# Patient Record
Sex: FEMALE | Race: OTHER | HISPANIC OR LATINO
[De-identification: names, ages, dates, MRNs, and addresses within clinical notes are randomized per-mention and may not be internally consistent; named-entity substitution may affect disease eponyms.]

---

## 2024-01-27 ENCOUNTER — NON-APPOINTMENT (OUTPATIENT)
Age: 29
End: 2024-01-27

## 2024-01-28 ENCOUNTER — EMERGENCY (EMERGENCY)
Facility: HOSPITAL | Age: 29
LOS: 1 days | Discharge: HOME | End: 2024-01-28
Attending: EMERGENCY MEDICINE | Admitting: EMERGENCY MEDICINE
Payer: MEDICAID

## 2024-01-28 VITALS
TEMPERATURE: 99 F | HEIGHT: 61 IN | WEIGHT: 134.92 LBS | RESPIRATION RATE: 16 BRPM | DIASTOLIC BLOOD PRESSURE: 84 MMHG | SYSTOLIC BLOOD PRESSURE: 122 MMHG | HEART RATE: 83 BPM | OXYGEN SATURATION: 98 %

## 2024-01-28 DIAGNOSIS — Z20.822 CONTACT WITH AND (SUSPECTED) EXPOSURE TO COVID-19: ICD-10-CM

## 2024-01-28 DIAGNOSIS — R10.9 UNSPECIFIED ABDOMINAL PAIN: ICD-10-CM

## 2024-01-28 DIAGNOSIS — R74.01 ELEVATION OF LEVELS OF LIVER TRANSAMINASE LEVELS: ICD-10-CM

## 2024-01-28 DIAGNOSIS — M54.9 DORSALGIA, UNSPECIFIED: ICD-10-CM

## 2024-01-28 DIAGNOSIS — R50.9 FEVER, UNSPECIFIED: ICD-10-CM

## 2024-01-28 DIAGNOSIS — R19.7 DIARRHEA, UNSPECIFIED: ICD-10-CM

## 2024-01-28 DIAGNOSIS — R11.0 NAUSEA: ICD-10-CM

## 2024-01-28 LAB
ALBUMIN SERPL ELPH-MCNC: 3.7 G/DL — SIGNIFICANT CHANGE UP (ref 3.4–5)
ALP SERPL-CCNC: 166 U/L — HIGH (ref 40–120)
ALT FLD-CCNC: 579 U/L — HIGH (ref 12–42)
ANION GAP SERPL CALC-SCNC: 9 MMOL/L — SIGNIFICANT CHANGE UP (ref 9–16)
APPEARANCE UR: CLEAR — SIGNIFICANT CHANGE UP
AST SERPL-CCNC: 533 U/L — HIGH (ref 15–37)
BACTERIA # UR AUTO: ABNORMAL /HPF
BASOPHILS # BLD AUTO: 0 K/UL — SIGNIFICANT CHANGE UP (ref 0–0.2)
BASOPHILS NFR BLD AUTO: 0 % — SIGNIFICANT CHANGE UP (ref 0–2)
BILIRUB SERPL-MCNC: 1 MG/DL — SIGNIFICANT CHANGE UP (ref 0.2–1.2)
BILIRUB UR-MCNC: NEGATIVE — SIGNIFICANT CHANGE UP
BUN SERPL-MCNC: 7 MG/DL — SIGNIFICANT CHANGE UP (ref 7–23)
CALCIUM SERPL-MCNC: 9 MG/DL — SIGNIFICANT CHANGE UP (ref 8.5–10.5)
CHLORIDE SERPL-SCNC: 104 MMOL/L — SIGNIFICANT CHANGE UP (ref 96–108)
CO2 SERPL-SCNC: 27 MMOL/L — SIGNIFICANT CHANGE UP (ref 22–31)
COLOR SPEC: SIGNIFICANT CHANGE UP
CREAT SERPL-MCNC: 0.74 MG/DL — SIGNIFICANT CHANGE UP (ref 0.5–1.3)
DIFF PNL FLD: ABNORMAL
EGFR: 114 ML/MIN/1.73M2 — SIGNIFICANT CHANGE UP
EOSINOPHIL # BLD AUTO: 0.05 K/UL — SIGNIFICANT CHANGE UP (ref 0–0.5)
EOSINOPHIL NFR BLD AUTO: 1 % — SIGNIFICANT CHANGE UP (ref 0–6)
EPI CELLS # UR: PRESENT
FLUAV AG NPH QL: SIGNIFICANT CHANGE UP
FLUBV AG NPH QL: SIGNIFICANT CHANGE UP
GLUCOSE SERPL-MCNC: 102 MG/DL — HIGH (ref 70–99)
GLUCOSE UR QL: NEGATIVE MG/DL — SIGNIFICANT CHANGE UP
HCG UR QL: NEGATIVE — SIGNIFICANT CHANGE UP
HCT VFR BLD CALC: 36.1 % — SIGNIFICANT CHANGE UP (ref 34.5–45)
HETEROPH AB TITR SER AGGL: NEGATIVE — SIGNIFICANT CHANGE UP
HGB BLD-MCNC: 12 G/DL — SIGNIFICANT CHANGE UP (ref 11.5–15.5)
KETONES UR-MCNC: NEGATIVE MG/DL — SIGNIFICANT CHANGE UP
LEUKOCYTE ESTERASE UR-ACNC: ABNORMAL
LYMPHOCYTES # BLD AUTO: 1.58 K/UL — SIGNIFICANT CHANGE UP (ref 1–3.3)
LYMPHOCYTES # BLD AUTO: 29 % — SIGNIFICANT CHANGE UP (ref 13–44)
MCHC RBC-ENTMCNC: 30.3 PG — SIGNIFICANT CHANGE UP (ref 27–34)
MCHC RBC-ENTMCNC: 33.2 GM/DL — SIGNIFICANT CHANGE UP (ref 32–36)
MCV RBC AUTO: 91.2 FL — SIGNIFICANT CHANGE UP (ref 80–100)
MONOCYTES # BLD AUTO: 0.87 K/UL — SIGNIFICANT CHANGE UP (ref 0–0.9)
MONOCYTES NFR BLD AUTO: 16 % — HIGH (ref 2–14)
NEUTROPHILS # BLD AUTO: 2.67 K/UL — SIGNIFICANT CHANGE UP (ref 1.8–7.4)
NEUTROPHILS NFR BLD AUTO: 49 % — SIGNIFICANT CHANGE UP (ref 43–77)
NITRITE UR-MCNC: NEGATIVE — SIGNIFICANT CHANGE UP
NRBC # BLD: 0 /100 WBCS — SIGNIFICANT CHANGE UP (ref 0–0)
NRBC # BLD: SIGNIFICANT CHANGE UP /100 WBCS (ref 0–0)
PH UR: 5.5 — SIGNIFICANT CHANGE UP (ref 5–8)
PLAT MORPH BLD: NORMAL — SIGNIFICANT CHANGE UP
PLATELET # BLD AUTO: 141 K/UL — LOW (ref 150–400)
POTASSIUM SERPL-MCNC: 4.4 MMOL/L — SIGNIFICANT CHANGE UP (ref 3.5–5.3)
POTASSIUM SERPL-SCNC: 4.4 MMOL/L — SIGNIFICANT CHANGE UP (ref 3.5–5.3)
PROT SERPL-MCNC: 7.2 G/DL — SIGNIFICANT CHANGE UP (ref 6.4–8.2)
PROT UR-MCNC: 30 MG/DL
RBC # BLD: 3.96 M/UL — SIGNIFICANT CHANGE UP (ref 3.8–5.2)
RBC # FLD: 13.4 % — SIGNIFICANT CHANGE UP (ref 10.3–14.5)
RBC BLD AUTO: NORMAL — SIGNIFICANT CHANGE UP
RBC CASTS # UR COMP ASSIST: 50 /HPF — HIGH (ref 0–4)
RSV RNA NPH QL NAA+NON-PROBE: SIGNIFICANT CHANGE UP
SARS-COV-2 RNA SPEC QL NAA+PROBE: SIGNIFICANT CHANGE UP
SODIUM SERPL-SCNC: 140 MMOL/L — SIGNIFICANT CHANGE UP (ref 132–145)
SP GR SPEC: 1.02 — SIGNIFICANT CHANGE UP (ref 1–1.03)
UROBILINOGEN FLD QL: 1 MG/DL — SIGNIFICANT CHANGE UP (ref 0.2–1)
VARIANT LYMPHS # BLD: 5 % — SIGNIFICANT CHANGE UP (ref 0–6)
WBC # BLD: 5.44 K/UL — SIGNIFICANT CHANGE UP (ref 3.8–10.5)
WBC # FLD AUTO: 5.44 K/UL — SIGNIFICANT CHANGE UP (ref 3.8–10.5)
WBC UR QL: 2 /HPF — SIGNIFICANT CHANGE UP (ref 0–5)

## 2024-01-28 PROCEDURE — 99285 EMERGENCY DEPT VISIT HI MDM: CPT

## 2024-01-28 PROCEDURE — 74177 CT ABD & PELVIS W/CONTRAST: CPT | Mod: 26

## 2024-01-28 RX ORDER — IBUPROFEN 200 MG
600 TABLET ORAL ONCE
Refills: 0 | Status: COMPLETED | OUTPATIENT
Start: 2024-01-28 | End: 2024-01-28

## 2024-01-28 RX ORDER — SODIUM CHLORIDE 9 MG/ML
1000 INJECTION INTRAMUSCULAR; INTRAVENOUS; SUBCUTANEOUS ONCE
Refills: 0 | Status: COMPLETED | OUTPATIENT
Start: 2024-01-28 | End: 2024-01-28

## 2024-01-28 RX ORDER — ONDANSETRON 8 MG/1
4 TABLET, FILM COATED ORAL ONCE
Refills: 0 | Status: COMPLETED | OUTPATIENT
Start: 2024-01-28 | End: 2024-01-28

## 2024-01-28 RX ORDER — IOHEXOL 300 MG/ML
30 INJECTION, SOLUTION INTRAVENOUS ONCE
Refills: 0 | Status: COMPLETED | OUTPATIENT
Start: 2024-01-28 | End: 2024-01-28

## 2024-01-28 RX ADMIN — ONDANSETRON 4 MILLIGRAM(S): 8 TABLET, FILM COATED ORAL at 23:34

## 2024-01-28 RX ADMIN — SODIUM CHLORIDE 1000 MILLILITER(S): 9 INJECTION INTRAMUSCULAR; INTRAVENOUS; SUBCUTANEOUS at 18:48

## 2024-01-28 RX ADMIN — IOHEXOL 30 MILLILITER(S): 300 INJECTION, SOLUTION INTRAVENOUS at 19:58

## 2024-01-28 RX ADMIN — Medication 600 MILLIGRAM(S): at 23:34

## 2024-01-28 RX ADMIN — ONDANSETRON 4 MILLIGRAM(S): 8 TABLET, FILM COATED ORAL at 18:48

## 2024-01-28 NOTE — ED PROVIDER NOTE - NSFOLLOWUPINSTRUCTIONS_ED_ALL_ED_FT
IN YOUR WORK UP TODAY YOUR LIVER ENZYMES ARE ELEVATED. THIS SHOULD BE REPEATED IN THE NEAR FUTURE TO MAKE SURE IT'S BETTER.    IF YOU HAVE ANY WORSENING SYMPTOMS RETURN TO ER FOR REEVALUATION.     Back Pain    Back pain is very common in adults. The cause of back pain is rarely dangerous and the pain often gets better over time. The cause of your back pain may not be known and may include strain of muscles or ligaments, degeneration of the spinal disks, or arthritis. Occasionally the pain may radiate down your leg(s). Over-the-counter medicines to reduce pain and inflammation are often the most helpful. Stretching and remaining active frequently helps the healing process.     SEEK IMMEDIATE MEDICAL CARE IF YOU HAVE ANY OF THE FOLLOWING SYMPTOMS: bowel or bladder control problems, unusual weakness or numbness in your arms or legs, nausea or vomiting, abdominal pain, fever, dizziness/lightheadedness.

## 2024-01-28 NOTE — ED PROVIDER NOTE - PATIENT PORTAL LINK FT
You can access the FollowMyHealth Patient Portal offered by [facility] by registering at the following website: [email or web address]. By joining [facility]’s FollowMyHealth portal, you will also be able to view your health information using other applications (apps) compatible with our system.

## 2024-01-28 NOTE — ED ADULT TRIAGE NOTE - CHIEF COMPLAINT QUOTE
sent from urgent care for eval of lower back pain since thursday, fever and abd pain x 2 days,  (-covid/flu)

## 2024-01-28 NOTE — ED ADULT NURSE NOTE - NSFALLUNIVINTERV_ED_ALL_ED
Bed/Stretcher in lowest position, wheels locked, appropriate side rails in place/Call bell, personal items and telephone in reach/Instruct patient to call for assistance before getting out of bed/chair/stretcher/Non-slip footwear applied when patient is off stretcher/[place] to call system/Physically safe environment - no spills, clutter or unnecessary equipment/Purposeful proactive rounding/Room/bathroom lighting operational, light cord in reach

## 2024-01-28 NOTE — ED PROVIDER NOTE - OBJECTIVE STATEMENT
29 yo woman to ER with her partner from [facility] complaint of back and flank pain and low grade fever earlier today  flu and covid neg at [facility]   mild nausea and a small amount of diarrhea no abd pain     The patient denies the following symptoms:  headache, dizziness/lightheadedness, neck pain/neck stiffness, numbness/tingling, photophobia, change in vision/hearing/gait/mental status/speech,difficulty swallowing, focal weakness, rash,  chest/neck/jaw/arm or back pain, palpitations, shortness of breath, diaphoresis, swelling to arm and/or legs,, abdominal pain, abdominal distention, back pain, flank pain, change in urinary or bowel function, dysuria,or  hematuria

## 2024-01-28 NOTE — ED PROVIDER NOTE - CLINICAL SUMMARY MEDICAL DECISION MAKING FREE TEXT BOX
29 yo woman to ER with her partner from [facility] [place] complaint of back and flank pain and low grade fever earlier today  flu and covid neg at [facility]   mild nausea and a small amount of diarrhea no abd pain     iv labs meds and reassess 29 yo woman to ER with her partner from [facility] complaint of back and flank pain and low grade fever earlier today  flu and covid neg at [facility]   mild nausea and a small amount of diarrhea no abd pain     iv labs meds and reassess    ct and mono spot for alk phos ast alt   @830pm pt comfortable no change in clinical status updated with lab results and plan for ct scan   endorsed to dr jackson @ 845pm

## 2024-01-28 NOTE — ED ADULT NURSE NOTE - OBJECTIVE STATEMENT
aox3, breathing even and unlabored on RA c.o abd pain and lowe rback pain. denies chest pain, denies burning with urination

## 2024-01-28 NOTE — ED PROVIDER NOTE - PROGRESS NOTE DETAILS
Patient is signed out by Dr. Sanderson pending CT Abdo results.  On blood work patient noted to have a transaminitis but patient is very well-appearing and nontender in abdomen.  CT Abdo with no actionable results.  Explained to patient she should get repeat blood work in the near future to make sure this has resolved.  If any worsening symptoms encouraged to return to emergency room.  Otherwise recommend supportive treatment for back pain and likely viral illness.  Patient had a negative swab and monotest today.  Will will flag chart for call back to establish primary care.

## 2024-02-01 VITALS
WEIGHT: 134.92 LBS | DIASTOLIC BLOOD PRESSURE: 82 MMHG | RESPIRATION RATE: 18 BRPM | SYSTOLIC BLOOD PRESSURE: 125 MMHG | OXYGEN SATURATION: 97 % | HEIGHT: 61 IN | TEMPERATURE: 99 F | HEART RATE: 88 BPM

## 2024-02-01 LAB
ALBUMIN SERPL ELPH-MCNC: 3.2 G/DL — LOW (ref 3.4–5)
ALP SERPL-CCNC: 303 U/L — HIGH (ref 40–120)
ALT FLD-CCNC: 873 U/L — HIGH (ref 12–42)
ANION GAP SERPL CALC-SCNC: 8 MMOL/L — LOW (ref 9–16)
AST SERPL-CCNC: 481 U/L — HIGH (ref 15–37)
BILIRUB SERPL-MCNC: 4 MG/DL — HIGH (ref 0.2–1.2)
BUN SERPL-MCNC: 7 MG/DL — SIGNIFICANT CHANGE UP (ref 7–23)
CALCIUM SERPL-MCNC: 8.8 MG/DL — SIGNIFICANT CHANGE UP (ref 8.5–10.5)
CHLORIDE SERPL-SCNC: 102 MMOL/L — SIGNIFICANT CHANGE UP (ref 96–108)
CO2 SERPL-SCNC: 28 MMOL/L — SIGNIFICANT CHANGE UP (ref 22–31)
CREAT SERPL-MCNC: 0.67 MG/DL — SIGNIFICANT CHANGE UP (ref 0.5–1.3)
EGFR: 124 ML/MIN/1.73M2 — SIGNIFICANT CHANGE UP
FLUAV AG NPH QL: SIGNIFICANT CHANGE UP
FLUBV AG NPH QL: SIGNIFICANT CHANGE UP
GLUCOSE SERPL-MCNC: 114 MG/DL — HIGH (ref 70–99)
HCT VFR BLD CALC: 34.2 % — LOW (ref 34.5–45)
HGB BLD-MCNC: 11.3 G/DL — LOW (ref 11.5–15.5)
LACTATE BLDV-MCNC: 1.4 MMOL/L — SIGNIFICANT CHANGE UP (ref 0.5–2)
LIDOCAIN IGE QN: 47 U/L — SIGNIFICANT CHANGE UP (ref 16–77)
MCHC RBC-ENTMCNC: 30 PG — SIGNIFICANT CHANGE UP (ref 27–34)
MCHC RBC-ENTMCNC: 33 GM/DL — SIGNIFICANT CHANGE UP (ref 32–36)
MCV RBC AUTO: 90.7 FL — SIGNIFICANT CHANGE UP (ref 80–100)
PCO2 BLDV: 52 MMHG — HIGH (ref 39–42)
PH BLDV: 7.37 — SIGNIFICANT CHANGE UP (ref 7.32–7.43)
PLATELET # BLD AUTO: 185 K/UL — SIGNIFICANT CHANGE UP (ref 150–400)
PO2 BLDV: <35 MMHG — SIGNIFICANT CHANGE UP (ref 25–45)
POTASSIUM SERPL-MCNC: 4.7 MMOL/L — SIGNIFICANT CHANGE UP (ref 3.5–5.3)
POTASSIUM SERPL-SCNC: 4.7 MMOL/L — SIGNIFICANT CHANGE UP (ref 3.5–5.3)
PROT SERPL-MCNC: 7.4 G/DL — SIGNIFICANT CHANGE UP (ref 6.4–8.2)
RBC # BLD: 3.77 M/UL — LOW (ref 3.8–5.2)
RBC # FLD: 13.8 % — SIGNIFICANT CHANGE UP (ref 10.3–14.5)
RSV RNA NPH QL NAA+NON-PROBE: SIGNIFICANT CHANGE UP
SAO2 % BLDV: 26.7 % — LOW (ref 67–88)
SARS-COV-2 RNA SPEC QL NAA+PROBE: SIGNIFICANT CHANGE UP
SODIUM SERPL-SCNC: 138 MMOL/L — SIGNIFICANT CHANGE UP (ref 132–145)
WBC # BLD: 8.3 K/UL — SIGNIFICANT CHANGE UP (ref 3.8–10.5)
WBC # FLD AUTO: 8.3 K/UL — SIGNIFICANT CHANGE UP (ref 3.8–10.5)

## 2024-02-01 PROCEDURE — 99285 EMERGENCY DEPT VISIT HI MDM: CPT

## 2024-02-01 NOTE — ED ADULT TRIAGE NOTE - CHIEF COMPLAINT QUOTE
walk in pt with complaints of left sided flank pain and LUQ pain. Pt reports she was here 4 days ago and is concerned she is not getting better. Pt reports increased weakness, n/v and lethargy as well as some slight yellowing of sclera. Also endorses she was in [place] 2 weeks ago and is concerned for dengue fever as she had mult mosquito bites.

## 2024-02-02 ENCOUNTER — INPATIENT (INPATIENT)
Facility: HOSPITAL | Age: 29
LOS: 1 days | Discharge: HOME | DRG: 442 | End: 2024-02-04
Attending: HOSPITALIST | Admitting: HOSPITALIST
Payer: MEDICAID

## 2024-02-02 DIAGNOSIS — Z29.9 ENCOUNTER FOR PROPHYLACTIC MEASURES, UNSPECIFIED: ICD-10-CM

## 2024-02-02 DIAGNOSIS — E80.6 OTHER DISORDERS OF BILIRUBIN METABOLISM: ICD-10-CM

## 2024-02-02 DIAGNOSIS — R10.9 UNSPECIFIED ABDOMINAL PAIN: ICD-10-CM

## 2024-02-02 DIAGNOSIS — D64.9 ANEMIA, UNSPECIFIED: ICD-10-CM

## 2024-02-02 DIAGNOSIS — F41.9 ANXIETY DISORDER, UNSPECIFIED: ICD-10-CM

## 2024-02-02 LAB
AMPHET UR-MCNC: NEGATIVE — SIGNIFICANT CHANGE UP
APAP SERPL-MCNC: 6.8 UG/ML — LOW (ref 10–30)
APPEARANCE UR: CLEAR — SIGNIFICANT CHANGE UP
APTT BLD: 31 SEC — SIGNIFICANT CHANGE UP (ref 24.5–35.6)
BACTERIA # UR AUTO: PRESENT /HPF — SIGNIFICANT CHANGE UP
BARBITURATES UR SCN-MCNC: NEGATIVE — SIGNIFICANT CHANGE UP
BASOPHILS # BLD AUTO: 0 K/UL — SIGNIFICANT CHANGE UP (ref 0–0.2)
BASOPHILS NFR BLD AUTO: 0 % — SIGNIFICANT CHANGE UP (ref 0–2)
BENZODIAZ UR-MCNC: NEGATIVE — SIGNIFICANT CHANGE UP
BILIRUB DIRECT SERPL-MCNC: 3 MG/DL — HIGH (ref 0–0.3)
BILIRUB INDIRECT FLD-MCNC: 0.9 MG/DL — SIGNIFICANT CHANGE UP (ref 0.2–1)
BILIRUB SERPL-MCNC: 3.9 MG/DL — HIGH (ref 0.2–1.2)
BILIRUB UR-MCNC: ABNORMAL
CK SERPL-CCNC: 80 U/L — SIGNIFICANT CHANGE UP (ref 26–192)
COCAINE METAB.OTHER UR-MCNC: NEGATIVE — SIGNIFICANT CHANGE UP
COLOR SPEC: SIGNIFICANT CHANGE UP
COMMENT - URINE: SIGNIFICANT CHANGE UP
CULTURE RESULTS: SIGNIFICANT CHANGE UP
DAT POLY-SP REAG RBC QL: NEGATIVE — SIGNIFICANT CHANGE UP
DIFF PNL FLD: NEGATIVE — SIGNIFICANT CHANGE UP
EOSINOPHIL # BLD AUTO: 0 K/UL — SIGNIFICANT CHANGE UP (ref 0–0.5)
EOSINOPHIL NFR BLD AUTO: 0 % — SIGNIFICANT CHANGE UP (ref 0–6)
EPI CELLS # UR: 2 — SIGNIFICANT CHANGE UP
FENTANYL UR QL SCN: NEGATIVE — SIGNIFICANT CHANGE UP
GIANT PLATELETS BLD QL SMEAR: PRESENT — SIGNIFICANT CHANGE UP
GLUCOSE UR QL: NEGATIVE MG/DL — SIGNIFICANT CHANGE UP
GRAN CASTS # UR COMP ASSIST: 2 — SIGNIFICANT CHANGE UP
HAPTOGLOB SERPL-MCNC: <20 MG/DL — LOW (ref 34–200)
HCG SERPL-ACNC: 1 MIU/ML — SIGNIFICANT CHANGE UP
HIV 1 & 2 AB SERPL IA.RAPID: SIGNIFICANT CHANGE UP
INR BLD: 0.99 — SIGNIFICANT CHANGE UP (ref 0.85–1.18)
KETONES UR-MCNC: NEGATIVE MG/DL — SIGNIFICANT CHANGE UP
LDH SERPL L TO P-CCNC: 524 U/L — HIGH (ref 50–242)
LEUKOCYTE ESTERASE UR-ACNC: ABNORMAL
LYMPHOCYTES # BLD AUTO: 2.82 K/UL — SIGNIFICANT CHANGE UP (ref 1–3.3)
LYMPHOCYTES # BLD AUTO: 34 % — SIGNIFICANT CHANGE UP (ref 13–44)
MANUAL SMEAR VERIFICATION: SIGNIFICANT CHANGE UP
METHADONE UR-MCNC: NEGATIVE — SIGNIFICANT CHANGE UP
MONOCYTES # BLD AUTO: 1 K/UL — HIGH (ref 0–0.9)
MONOCYTES NFR BLD AUTO: 12 % — SIGNIFICANT CHANGE UP (ref 2–14)
NEUTROPHILS # BLD AUTO: 2.41 K/UL — SIGNIFICANT CHANGE UP (ref 1.8–7.4)
NEUTROPHILS NFR BLD AUTO: 26 % — LOW (ref 43–77)
NEUTS BAND # BLD: 3 % — SIGNIFICANT CHANGE UP (ref 0–8)
NITRITE UR-MCNC: NEGATIVE — SIGNIFICANT CHANGE UP
NRBC # BLD: 0 /100 WBCS — SIGNIFICANT CHANGE UP (ref 0–0)
NRBC # BLD: SIGNIFICANT CHANGE UP /100 WBCS (ref 0–0)
OPIATES UR-MCNC: NEGATIVE — SIGNIFICANT CHANGE UP
PCP SPEC-MCNC: SIGNIFICANT CHANGE UP
PCP UR-MCNC: NEGATIVE — SIGNIFICANT CHANGE UP
PH UR: 7 — SIGNIFICANT CHANGE UP (ref 5–8)
PLAT MORPH BLD: ABNORMAL
PLATELET COUNT - ESTIMATE: ABNORMAL
PROT UR-MCNC: NEGATIVE MG/DL — SIGNIFICANT CHANGE UP
PROTHROM AB SERPL-ACNC: 11.3 SEC — SIGNIFICANT CHANGE UP (ref 9.5–13)
RBC BLD AUTO: NORMAL — SIGNIFICANT CHANGE UP
RBC CASTS # UR COMP ASSIST: 0 /HPF — SIGNIFICANT CHANGE UP (ref 0–4)
SALICYLATES SERPL-MCNC: <0.2 MG/DL — LOW (ref 2.8–20)
SP GR SPEC: 1.01 — SIGNIFICANT CHANGE UP (ref 1–1.03)
SPECIMEN SOURCE: SIGNIFICANT CHANGE UP
THC UR QL: POSITIVE
UROBILINOGEN FLD QL: 2 MG/DL (ref 0.2–1)
VARIANT LYMPHS # BLD: 25 % — HIGH (ref 0–6)
WBC UR QL: 1 /HPF — SIGNIFICANT CHANGE UP (ref 0–5)

## 2024-02-02 PROCEDURE — 74177 CT ABD & PELVIS W/CONTRAST: CPT | Mod: 26

## 2024-02-02 PROCEDURE — 99223 1ST HOSP IP/OBS HIGH 75: CPT | Mod: GC

## 2024-02-02 RX ORDER — SODIUM CHLORIDE 9 MG/ML
1000 INJECTION INTRAMUSCULAR; INTRAVENOUS; SUBCUTANEOUS ONCE
Refills: 0 | Status: COMPLETED | OUTPATIENT
Start: 2024-02-02 | End: 2024-02-02

## 2024-02-02 RX ORDER — ESCITALOPRAM OXALATE 10 MG/1
20 TABLET, FILM COATED ORAL DAILY
Refills: 0 | Status: DISCONTINUED | OUTPATIENT
Start: 2024-02-02 | End: 2024-02-04

## 2024-02-02 RX ORDER — ONDANSETRON 8 MG/1
4 TABLET, FILM COATED ORAL ONCE
Refills: 0 | Status: COMPLETED | OUTPATIENT
Start: 2024-02-02 | End: 2024-02-02

## 2024-02-02 RX ORDER — IBUPROFEN 200 MG
400 TABLET ORAL ONCE
Refills: 0 | Status: COMPLETED | OUTPATIENT
Start: 2024-02-02 | End: 2024-02-02

## 2024-02-02 RX ORDER — INFLUENZA VIRUS VACCINE 15; 15; 15; 15 UG/.5ML; UG/.5ML; UG/.5ML; UG/.5ML
0.5 SUSPENSION INTRAMUSCULAR ONCE
Refills: 0 | Status: DISCONTINUED | OUTPATIENT
Start: 2024-02-02 | End: 2024-02-04

## 2024-02-02 RX ORDER — ACETAMINOPHEN 500 MG
650 TABLET ORAL EVERY 6 HOURS
Refills: 0 | Status: DISCONTINUED | OUTPATIENT
Start: 2024-02-02 | End: 2024-02-04

## 2024-02-02 RX ORDER — ONDANSETRON 8 MG/1
4 TABLET, FILM COATED ORAL EVERY 8 HOURS
Refills: 0 | Status: DISCONTINUED | OUTPATIENT
Start: 2024-02-02 | End: 2024-02-04

## 2024-02-02 RX ORDER — LANOLIN ALCOHOL/MO/W.PET/CERES
3 CREAM (GRAM) TOPICAL AT BEDTIME
Refills: 0 | Status: DISCONTINUED | OUTPATIENT
Start: 2024-02-02 | End: 2024-02-04

## 2024-02-02 RX ORDER — KETOROLAC TROMETHAMINE 30 MG/ML
15 SYRINGE (ML) INJECTION ONCE
Refills: 0 | Status: DISCONTINUED | OUTPATIENT
Start: 2024-02-02 | End: 2024-02-02

## 2024-02-02 RX ADMIN — Medication 650 MILLIGRAM(S): at 21:32

## 2024-02-02 RX ADMIN — Medication 400 MILLIGRAM(S): at 23:21

## 2024-02-02 RX ADMIN — ONDANSETRON 4 MILLIGRAM(S): 8 TABLET, FILM COATED ORAL at 04:33

## 2024-02-02 RX ADMIN — Medication 15 MILLIGRAM(S): at 04:33

## 2024-02-02 RX ADMIN — SODIUM CHLORIDE 1000 MILLILITER(S): 9 INJECTION INTRAMUSCULAR; INTRAVENOUS; SUBCUTANEOUS at 04:33

## 2024-02-02 RX ADMIN — ESCITALOPRAM OXALATE 20 MILLIGRAM(S): 10 TABLET, FILM COATED ORAL at 12:26

## 2024-02-02 RX ADMIN — SODIUM CHLORIDE 1000 MILLILITER(S): 9 INJECTION INTRAMUSCULAR; INTRAVENOUS; SUBCUTANEOUS at 00:49

## 2024-02-02 RX ADMIN — Medication 650 MILLIGRAM(S): at 23:07

## 2024-02-02 NOTE — H&P ADULT - NSHPPHYSICALEXAM_GEN_ALL_CORE
PHYSICAL EXAM:    GENERAL: resting comfortably in bed; NAD  HEAD:  Atraumatic, Normocephalic  EYES: EOMI, PERRLA, conjunctiva and sclera clear   NECK: Supple, No JVD, Normal thyroid, no enlarged nodes   NERVOUS SYSTEM:  AAOx3; CNII-XII grossly intact; no focal deficits  CHEST/LUNG: CTA B/L; no W/R/R, no retractions  HEART: S1/S2 normal, no S3, Regular rate and rhythm; No murmurs   ABDOMEN: Soft, Nondistended; LUQ TTP. Mildly tender in epigastric region. Bowel sounds present   EXTREMITIES:  2+ Peripheral Pulses, No clubbing, cyanosis, or edema   LYMPH: No lymphadenopathy noted   SKIN: No rashes or lesions  PSYCHIATRIC: affect and characteristics of appearance, verbalizations, behaviors are appropriate

## 2024-02-02 NOTE — ED PROVIDER NOTE - EYE EXAM METHOD
Bedside and Verbal shift change report given to [facility] (oncoming nurse) by Maria Santoro RN (offgoing nurse). Report included the following information SBAR, Kardex and MAR. Icteric sclera

## 2024-02-02 NOTE — H&P ADULT - PROBLEM SELECTOR PLAN 2
Pt w/ abdominal pain, scleral icterus after recent trip to [place]. Found to have direct hyperbilirubinemia.    -trend fractionated bilirubin   -f/u abdominal US to evaluate for biliary duct dilatation or stones  -w/u as above Pt w/ abdominal pain, after recent trip to [place]. Found to have direct hyperbilirubinemia.    -trend fractionated bilirubin   -w/u as above Pt w/ abdominal pain, after recent trip to [place]. Found to have direct hyperbilirubinemia. Endorses darker urine and light stools.     -trend fractionated bilirubin   -w/u as above

## 2024-02-02 NOTE — H&P ADULT - PROBLEM SELECTOR PLAN 1
#transaminitis  Presenting with nausea, abdominal pain, and inability to tolerate solid foods after a recent trip to [place]. Found to have hepatosplenomegaly and elevated LFTs likely 2/2 infectious process.   CT w/ hepatosplenogaly   Monospot negative during ED visit on 1/28    -f/u hepatitis panel  -f/u dengue antibodies  -trend LFTs #transaminitis  Presenting with nausea, abdominal pain, emesis, and inability to tolerate solid foods after a recent trip to [place]. Found to have hepatosplenomegaly and elevated LFTs likely 2/2 infectious process. Patient also endorses marijuana use daily w/ improvement of symptoms with hot showers. CMP findings more indicative of infectious process vs cannabinoid hyperemesis syndrome   CT w/ hepatosplenomegaly   Monospot negative during ED visit on 1/28    -f/u hepatitis panel  -f/u dengue antibodies  -f/u abdominal US to evaluate for biliary duct dilatation or stones  -trend LFTs #transaminitis  Presenting with nausea, abdominal pain, emesis, and inability to tolerate solid foods after a recent trip to [place]. Found to have hepatosplenomegaly and elevated LFTs likely 2/2 infectious process. Patient also endorses marijuana use daily w/ improvement of symptoms with hot showers. CMP findings more indicative of infectious process vs cannabinoid hyperemesis syndrome   CT w/ hepatosplenomegaly   Monospot negative during ED visit on 1/28    -f/u hepatitis panel  -f/u dengue antibodies  -f/u abdominal US to evaluate for biliary duct dilatation or stones  -trend LFTs  -f/u CMV EBV AMA

## 2024-02-02 NOTE — ED PROVIDER NOTE - CPE EDP HEAD NORM PED
"Javier Kyleaki Anderson was seen and treated in our emergency department on 1/16/2023.  She may return with limitations on 01/17/2023.       Sincerely,      KAI Hill    " Head atraumatic, normal cephalic shape.

## 2024-02-02 NOTE — ED PROVIDER NOTE - CLINICAL SUMMARY MEDICAL DECISION MAKING FREE TEXT BOX
Patient is a 28-year-old female previously well with no past medical history who recently traveled to [place] from January 14 to January 20, she is only on Lexapro for mild anxiety otherwise takes no other over-the-counter medications.  Patient reports right upper quadrant and epigastric abdominal pain for several days.  72 hours ago she was seen in the emergency room with nausea vomiting had mild elevation of her LFTs and a normal bilirubin.  Her Monospot was negative.  And she had a CT abdomen pelvis that showed hepatosplenomegaly.  Patient notes since going home she has not felt better she has been unable to tolerate solids and only fluids.  Has persistent nausea and today noticed that her eyes were yellow.  She denies a history of Tylenol overuse abuse, denies recreational drug use denies alcohol and non-smoker.      Abdominal exam without peritoneal signs. No evidence of acute abdomen at this time. Well appearing nut jaundiced. Recent workup with mild elevation of LFTs but bilirubin was normal, rule out acute hepatobiliary disease (including acute cholecystitis or cholangitis), acute pancreatitis (neg lipase), PUD (including gastric perforation), acute infectious processes (pneumonia, hepatitis, pyelonephritis), acute appendicitis, bowel obstruction, viscus perforation, or Diverticulitis.  Plan is to obtain comprehensive metabolic panel including bili and LFTs, check urine, EKG is nonischemic I do not suspect ACS.  I do not suspect pulmonary embolus.  Patient recently traveled to [place].  Rule out dengue sign dengue testing hepatitis panel testing.  Monospot was -4 days ago.

## 2024-02-02 NOTE — H&P ADULT - PROBLEM SELECTOR PLAN 5
F: None   E: Replete as necessary K>4 Mg>2  N: full liquid diet, advance as tolerated     DVT Prophylaxis: none needed  GI prophylaxis: None   CODE STATUS: FULL

## 2024-02-02 NOTE — ED PROVIDER NOTE - OBJECTIVE STATEMENT
Patient is a 28-year-old female previously well with no past medical history who recently traveled to [place] from January 14 to January 20, she is only on Lexapro for mild anxiety otherwise takes no other over-the-counter medications.  Patient reports right upper quadrant and epigastric abdominal pain for several days.  72 hours ago she was seen in the emergency room with nausea vomiting had mild elevation of her LFTs and a normal bilirubin.  Her Monospot was negative.  And she had a CT abdomen pelvis that showed hepatosplenomegaly.  Patient notes since going home she has not felt better she has been unable to tolerate solids and only fluids.  Has persistent nausea and today noticed that her eyes were yellow.  She denies a history of Tylenol overuse abuse, denies recreational drug use denies alcohol and non-smoker.

## 2024-02-02 NOTE — H&P ADULT - PROBLEM SELECTOR PLAN 3
Found to have Hgb of 11.3 on presentation, previous Hgb 12 three days prior. MCV WNL. Likely 2/2 hemolysis     -f/u LDH, haptoglobin   -plan as above

## 2024-02-02 NOTE — H&P ADULT - HISTORY OF PRESENT ILLNESS
HPI:  28F PMHx of anxiety who presents with nausea and abdominal pain. Pt presented to [facility] ED 3 days ago with similar complaints, at that time LFTs were elevated, CT +hepatosplenomegaly, neg monospot. Coming back today because she has not felt better since leaving the ED and has been unable to tolerate any solids, only fluids. Today she noticed that her eyes were yellow. Of note, the patient traveled to [place] from 1/14-1/20.    ED vitals: T  99 HR 88  RR 18 /82 SpO2 97% RA  Labs signficant: WBC 8.3, Hgb 11.3 Alk Phos 303, ,  Dir Bili 3.0 Indirect 0.9, Utox + THC  Imaging/EKG: NSR  CT AP w/IV hepatosplenomegaly   Interventions: 2L NS, toradol, zofran    PAST MEDICAL & SURGICAL HISTORY:  Anxiety    FAMILY HISTORY:  :      Allergies    No Known Allergies    Intolerances    :  Home Medications:  :    SOCIAL HX:     Smoking          ETOH/Illicit drugs          Occupation   HPI:  28F PMHx of anxiety who presents with nausea, abdominal pain, back pain and emesis. Pt presented to [facility] ED 3 days ago with similar complaints, at that time LFTs were elevated, CT +hepatosplenomegaly, neg monospot. Prior to coming to the ED three days ago, she went to an [facility] where she states she was febrile to "100. something". Coming back today because she has not felt better since leaving the ED and has been unable to tolerate any solids, only fluids. Today she noticed that her eyes were yellow. Of note, the patient traveled to [place] from 1/14-1/20. During her stay at [place], she endorses eating street food but no fish. Patient denies any vision changes, myalgias, joint pain, numbness, tingling.     ED vitals: T  99 HR 88  RR 18 /82 SpO2 97% RA  Labs signficant: WBC 8.3, Hgb 11.3 Alk Phos 303, ,  Dir Bili 3.0 Indirect 0.9, Utox + THC  Imaging/EKG: NSR  CT AP w/IV hepatosplenomegaly   Interventions: 2L NS, toradol, zofran    PAST MEDICAL & SURGICAL HISTORY:  Anxiety    FAMILY HISTORY:  :      Allergies    No Known Allergies    Intolerances    :  Home Medications:  Lexapro 20    SOCIAL HX:     Smoking        Marijuana 2-3x day, No tobacco use  ETOH/Illicit drugs        No   HPI:  28F PMHx of anxiety who presents with nausea, abdominal pain, back pain and emesis since last week. Pt presented to [facility] ED 3 days ago with similar complaints, at that time LFTs were elevated, CT +hepatosplenomegaly, neg monospot. Prior to coming to the ED three days ago, she went to an [facility] where she states she was febrile to "100. something". Coming back today because she has not felt better since leaving the ED and has been unable to tolerate any solids, only fluids. Today she noticed that her eyes were yellow. Of note, the patient traveled to [place] from 1/14-1/20. During her stay at [place], she endorses eating street food but no fish. Patient denies any vision changes, myalgias, diarrhea, joint pain, numbness, tingling. Endorses lighter stools and darker urine.    ED vitals: T  99 HR 88  RR 18 /82 SpO2 97% RA  Labs signficant: WBC 8.3, Hgb 11.3 Alk Phos 303, ,  Dir Bili 3.0 Indirect 0.9, Utox + THC  Imaging/EKG: NSR  CT AP w/IV hepatosplenomegaly   Interventions: 2L NS, toradol, zofran    PAST MEDICAL & SURGICAL HISTORY:  Anxiety    FAMILY HISTORY:  :      Allergies    No Known Allergies    Intolerances    :  Home Medications:  Lexapro 20    SOCIAL HX:     Smoking        Marijuana 2-3x day, No tobacco use  ETOH/Illicit drugs        No

## 2024-02-02 NOTE — H&P ADULT - ATTENDING COMMENTS
29YO F presenting with nausea, abdominal pain, and inability to tolerate solid foods after a recent trip to [place]. Found to have hepatosplenomegaly and elevated LFTs likely 2/2 infectious process. Admitted for further evaluation     #Transaminitis   #Hyperbilirubinemia   #Anemia     -CT: Hepatosplenomegaly. No other abnormality detected. No biliary dilation or Gall bladder pathology.   -Considering direct hyperbilirubinemia and abdominal pain, follow US RUQ.   -Follow Acute hep panel, dengue, EBV and CMV serologies  -Hemolytic anemia, follow blood smear, angela and coags. Currently hemodynamically stable.     Rest as per resident note

## 2024-02-02 NOTE — PATIENT PROFILE ADULT - FALL HARM RISK - UNIVERSAL INTERVENTIONS
Bed in lowest position, wheels locked, appropriate side rails in place/Call bell, personal items and telephone in reach/Instruct patient to call for assistance before getting out of bed or chair/Non-slip footwear when patient is out of bed/[place] to call system/Physically safe environment - no spills, clutter or unnecessary equipment/Purposeful Proactive Rounding/Room/bathroom lighting operational, light cord in reach

## 2024-02-02 NOTE — H&P ADULT - ASSESSMENT
29YO F presenting with nausea, abdominal pain, and inability to tolerate solid foods after a recent trip to [place]. Found to have hepatosplenomegaly and elevated LFTs likely 2/2 infectious process.

## 2024-02-02 NOTE — H&P ADULT - PROBLEM SELECTOR PLAN 4
Hx of anxiety  Home med: lexapro    -c home med Hx of anxiety  Home med: lexapro 20 qd    -c home med

## 2024-02-02 NOTE — ED ADULT NURSE REASSESSMENT NOTE - NS ED NURSE REASSESS COMMENT FT1
Received handoff from Maia Ardon RN. Patient in no acute distress. patient aaox4. patient comfortable. all needs met at this time. care continues. BMT

## 2024-02-02 NOTE — ED PROVIDER NOTE - PROGRESS NOTE DETAILS
I spoke presented the patient to Dr. Mustafa who agrees patient should be admitted as she is now having elevated bilirubin and she is jaundiced.  Recommended that we repeat the CT abdomen pelvis for biliary dilatation.  Patient otherwise has no signs of acute abdomen.  Also added additional testing including direct and indirect bilirubin, HIV testing is negative.  Added LDH and haptoglobin.  Patient to be endorsed to regional medicine if CT abdomen pelvis is stable for regional medicine.  Will sign out to Dr. Morales pending admission

## 2024-02-02 NOTE — H&P ADULT - NSHPLABSRESULTS_GEN_ALL_CORE
LABS:                         11.3   8.30  )-----------( 185      ( 2024 22:14 )             34.2         138  |  102  |  7   ----------------------------<  114<H>  4.7   |  28  |  0.67    Ca    8.8      2024 22:14    TPro  x   /  Alb  x   /  TBili  3.9<H>  /  DBili  3.0<H>  /  AST  x   /  ALT  x   /  AlkPhos  x         Urinalysis Basic - ( 2024 01:24 )    Color: Dark Yellow / Appearance: Clear / S.008 / pH: x  Gluc: x / Ketone: Negative mg/dL  / Bili: Small / Urobili: 2.0 mg/dL   Blood: x / Protein: Negative mg/dL / Nitrite: Negative   Leuk Esterase: Trace / RBC: 0 /HPF / WBC 1 /HPF   Sq Epi: x / Non Sq Epi: x / Bacteria: Present /HPF      CARDIAC MARKERS ( 2024 23:45 )  x     / x     / 80 U/L / x     / x                RADIOLOGY, EKG & ADDITIONAL TESTS: Reviewed.

## 2024-02-03 DIAGNOSIS — D59.9 ACQUIRED HEMOLYTIC ANEMIA, UNSPECIFIED: ICD-10-CM

## 2024-02-03 LAB
ALBUMIN SERPL ELPH-MCNC: 3.5 G/DL — SIGNIFICANT CHANGE UP (ref 3.3–5)
ALBUMIN SERPL ELPH-MCNC: 4.2 G/DL — SIGNIFICANT CHANGE UP (ref 3.3–5)
ALP SERPL-CCNC: 304 U/L — HIGH (ref 40–120)
ALP SERPL-CCNC: 369 U/L — HIGH (ref 40–120)
ALT FLD-CCNC: 632 U/L — HIGH (ref 10–45)
ALT FLD-CCNC: 734 U/L — HIGH (ref 10–45)
ANION GAP SERPL CALC-SCNC: 10 MMOL/L — SIGNIFICANT CHANGE UP (ref 5–17)
ANION GAP SERPL CALC-SCNC: 8 MMOL/L — SIGNIFICANT CHANGE UP (ref 5–17)
ANISOCYTOSIS BLD QL: SLIGHT — SIGNIFICANT CHANGE UP
AST SERPL-CCNC: 293 U/L — HIGH (ref 10–40)
AST SERPL-CCNC: 297 U/L — HIGH (ref 10–40)
BASOPHILS # BLD AUTO: 0.06 K/UL — SIGNIFICANT CHANGE UP (ref 0–0.2)
BASOPHILS NFR BLD AUTO: 0.9 % — SIGNIFICANT CHANGE UP (ref 0–2)
BILIRUB DIRECT SERPL-MCNC: 1.1 MG/DL — HIGH (ref 0–0.3)
BILIRUB INDIRECT FLD-MCNC: 0.8 MG/DL — SIGNIFICANT CHANGE UP (ref 0.2–1)
BILIRUB SERPL-MCNC: 1.9 MG/DL — HIGH (ref 0.2–1.2)
BILIRUB SERPL-MCNC: 1.9 MG/DL — HIGH (ref 0.2–1.2)
BILIRUB SERPL-MCNC: 2.1 MG/DL — HIGH (ref 0.2–1.2)
BUN SERPL-MCNC: 5 MG/DL — LOW (ref 7–23)
BUN SERPL-MCNC: 5 MG/DL — LOW (ref 7–23)
BURR CELLS BLD QL SMEAR: PRESENT — SIGNIFICANT CHANGE UP
CALCIUM SERPL-MCNC: 8.9 MG/DL — SIGNIFICANT CHANGE UP (ref 8.4–10.5)
CALCIUM SERPL-MCNC: 9.6 MG/DL — SIGNIFICANT CHANGE UP (ref 8.4–10.5)
CHLORIDE SERPL-SCNC: 104 MMOL/L — SIGNIFICANT CHANGE UP (ref 96–108)
CHLORIDE SERPL-SCNC: 105 MMOL/L — SIGNIFICANT CHANGE UP (ref 96–108)
CO2 SERPL-SCNC: 26 MMOL/L — SIGNIFICANT CHANGE UP (ref 22–31)
CO2 SERPL-SCNC: 27 MMOL/L — SIGNIFICANT CHANGE UP (ref 22–31)
CREAT SERPL-MCNC: 0.56 MG/DL — SIGNIFICANT CHANGE UP (ref 0.5–1.3)
CREAT SERPL-MCNC: 0.65 MG/DL — SIGNIFICANT CHANGE UP (ref 0.5–1.3)
CULTURE RESULTS: SIGNIFICANT CHANGE UP
EGFR: 124 ML/MIN/1.73M2 — SIGNIFICANT CHANGE UP
EGFR: 129 ML/MIN/1.73M2 — SIGNIFICANT CHANGE UP
EOSINOPHIL # BLD AUTO: 0.12 K/UL — SIGNIFICANT CHANGE UP (ref 0–0.5)
EOSINOPHIL NFR BLD AUTO: 1.7 % — SIGNIFICANT CHANGE UP (ref 0–6)
GGT SERPL-CCNC: 187 U/L — HIGH (ref 8–40)
GLUCOSE SERPL-MCNC: 89 MG/DL — SIGNIFICANT CHANGE UP (ref 70–99)
GLUCOSE SERPL-MCNC: 99 MG/DL — SIGNIFICANT CHANGE UP (ref 70–99)
HAPTOGLOB SERPL-MCNC: <10 MG/DL — LOW (ref 34–200)
HAV IGM SER-ACNC: SIGNIFICANT CHANGE UP
HBV CORE IGM SER-ACNC: SIGNIFICANT CHANGE UP
HBV SURFACE AG SER-ACNC: SIGNIFICANT CHANGE UP
HCT VFR BLD CALC: 33.9 % — LOW (ref 34.5–45)
HCV AB S/CO SERPL IA: 0.1 S/CO — SIGNIFICANT CHANGE UP (ref 0–0.99)
HCV AB SERPL-IMP: SIGNIFICANT CHANGE UP
HGB BLD-MCNC: 11.3 G/DL — LOW (ref 11.5–15.5)
LDH SERPL L TO P-CCNC: 664 U/L — HIGH (ref 50–242)
LYMPHOCYTES # BLD AUTO: 3.41 K/UL — HIGH (ref 1–3.3)
LYMPHOCYTES # BLD AUTO: 50 % — HIGH (ref 13–44)
MAGNESIUM SERPL-MCNC: 2 MG/DL — SIGNIFICANT CHANGE UP (ref 1.6–2.6)
MANUAL SMEAR VERIFICATION: SIGNIFICANT CHANGE UP
MCHC RBC-ENTMCNC: 30.3 PG — SIGNIFICANT CHANGE UP (ref 27–34)
MCHC RBC-ENTMCNC: 33.3 GM/DL — SIGNIFICANT CHANGE UP (ref 32–36)
MCV RBC AUTO: 90.9 FL — SIGNIFICANT CHANGE UP (ref 80–100)
MICROCYTES BLD QL: SLIGHT — SIGNIFICANT CHANGE UP
MONOCYTES # BLD AUTO: 0.36 K/UL — SIGNIFICANT CHANGE UP (ref 0–0.9)
MONOCYTES NFR BLD AUTO: 5.3 % — SIGNIFICANT CHANGE UP (ref 2–14)
MYELOCYTES NFR BLD: 0.9 % — HIGH (ref 0–0)
NEUTROPHILS # BLD AUTO: 2.81 K/UL — SIGNIFICANT CHANGE UP (ref 1.8–7.4)
NEUTROPHILS NFR BLD AUTO: 40.3 % — LOW (ref 43–77)
NEUTS BAND # BLD: 0.9 % — SIGNIFICANT CHANGE UP (ref 0–8)
NRBC # BLD: 0 /100 WBCS — SIGNIFICANT CHANGE UP (ref 0–0)
OVALOCYTES BLD QL SMEAR: SLIGHT — SIGNIFICANT CHANGE UP
PHOSPHATE SERPL-MCNC: 3.3 MG/DL — SIGNIFICANT CHANGE UP (ref 2.5–4.5)
PLAT MORPH BLD: NORMAL — SIGNIFICANT CHANGE UP
PLATELET # BLD AUTO: 161 K/UL — SIGNIFICANT CHANGE UP (ref 150–400)
POIKILOCYTOSIS BLD QL AUTO: SLIGHT — SIGNIFICANT CHANGE UP
POLYCHROMASIA BLD QL SMEAR: SLIGHT — SIGNIFICANT CHANGE UP
POTASSIUM SERPL-MCNC: 3.9 MMOL/L — SIGNIFICANT CHANGE UP (ref 3.5–5.3)
POTASSIUM SERPL-MCNC: 4.7 MMOL/L — SIGNIFICANT CHANGE UP (ref 3.5–5.3)
POTASSIUM SERPL-SCNC: 3.9 MMOL/L — SIGNIFICANT CHANGE UP (ref 3.5–5.3)
POTASSIUM SERPL-SCNC: 4.7 MMOL/L — SIGNIFICANT CHANGE UP (ref 3.5–5.3)
PROT SERPL-MCNC: 6.3 G/DL — SIGNIFICANT CHANGE UP (ref 6–8.3)
PROT SERPL-MCNC: 7.2 G/DL — SIGNIFICANT CHANGE UP (ref 6–8.3)
RBC # BLD: 3.73 M/UL — LOW (ref 3.8–5.2)
RBC # FLD: 14.6 % — HIGH (ref 10.3–14.5)
RBC BLD AUTO: ABNORMAL
RETICS #: 86.3 K/UL — SIGNIFICANT CHANGE UP (ref 25–125)
RETICS/RBC NFR: 2.3 % — SIGNIFICANT CHANGE UP (ref 0.5–2.5)
SMUDGE CELLS # BLD: PRESENT — SIGNIFICANT CHANGE UP
SODIUM SERPL-SCNC: 140 MMOL/L — SIGNIFICANT CHANGE UP (ref 135–145)
SODIUM SERPL-SCNC: 140 MMOL/L — SIGNIFICANT CHANGE UP (ref 135–145)
SPECIMEN SOURCE: SIGNIFICANT CHANGE UP
WBC # BLD: 6.81 K/UL — SIGNIFICANT CHANGE UP (ref 3.8–10.5)
WBC # FLD AUTO: 6.81 K/UL — SIGNIFICANT CHANGE UP (ref 3.8–10.5)

## 2024-02-03 PROCEDURE — 99233 SBSQ HOSP IP/OBS HIGH 50: CPT | Mod: GC

## 2024-02-03 RX ORDER — ESCITALOPRAM OXALATE 10 MG/1
1 TABLET, FILM COATED ORAL
Refills: 0 | DISCHARGE

## 2024-02-03 RX ADMIN — ESCITALOPRAM OXALATE 20 MILLIGRAM(S): 10 TABLET, FILM COATED ORAL at 12:46

## 2024-02-03 RX ADMIN — Medication 400 MILLIGRAM(S): at 00:12

## 2024-02-03 NOTE — CONSULT NOTE ADULT - SUBJECTIVE AND OBJECTIVE BOX
Hematology Oncology Consult Note     MARY BRYANT is a 28 year old female with PMH anxiety who presents with nausea, abdominal pain, back pain and emesis for 1 week. Hematology is consulted for concern for hemolytic anemia     Patient presented to [facility] ED 3 days prior with similar complaints, where she states she was febrile. Symptoms had not resolved, and so she represented today. Patient reportedly went to [facility]  recently, though she endorsed eating street food but no fish.She reports     Labs significant for WBC 6.81, Hgb 11.3, Platelet 161.   Haptoglobin < 10  Bilirubin 1.9 --> 2.1.   SILVERIO negative  LFT - ,     PAST MEDICAL & SURGICAL HISTORY:      Allergies:  No Known Allergies      Medications:        Social History:    FAMILY HISTORY:      PHYSICAL EXAM:    T(F): 98.8 (02-03-24 @ 15:38), Max: 98.8 (02-03-24 @ 15:38)  HR: 96 (02-03-24 @ 15:38) (69 - 96)  BP: 102/68 (02-03-24 @ 15:38) (102/68 - 131/85)  RR: 18 (02-03-24 @ 15:38) (18 - 18)  SpO2: 97% (02-03-24 @ 15:38) (97% - 99%)  Wt(kg): --    Daily     Daily     Gen: well developed, well nourished, comfortable  HEENT: normocephalic/atraumatic, no conjunctival pallor, no scleral icterus, no oral thrush/mucosal bleeding/mucositis  Neck: supple, no masses, no JVD  Breasts: deferred  Back: nontender  Cardiovascular: RR, nl S1S2, no murmurs/rubs/gallops  Respiratory: clear air entry b/l  Gastrointestinal: BS+, soft, NT/ND, no masses, no splenomegaly, no hepatomegaly, no evidence for ascites  Genitourinary: deferred  Rectal: deferred  Extremities: no clubbing/cyanosis, no edema, no calf tenderness  Vascular:  DP/PT 2+ b/l  Neurological: CN 2-12 grossly intact, no focal deficits  Skin: no rash on visible skin  Lymph Nodes:  no cervical/supraclavicular LAD, no axillary/groin LAD  Musculoskeletal:  full ROM  Psychiatric:  mood stable            Labs:                          11.3   6.81  )-----------( 161      ( 03 Feb 2024 07:19 )             33.9     CBC Full  -  ( 03 Feb 2024 07:19 )  WBC Count : 6.81 K/uL  RBC Count : 3.73 M/uL  Hemoglobin : 11.3 g/dL  Hematocrit : 33.9 %  Platelet Count - Automated : 161 K/uL  Mean Cell Volume : 90.9 fl  Mean Cell Hemoglobin : 30.3 pg  Mean Cell Hemoglobin Concentration : 33.3 gm/dL  Auto Neutrophil # : 2.81 K/uL  Auto Lymphocyte # : 3.41 K/uL  Auto Monocyte # : 0.36 K/uL  Auto Eosinophil # : 0.12 K/uL  Auto Basophil # : 0.06 K/uL  Auto Neutrophil % : 40.3 %  Auto Lymphocyte % : 50.0 %  Auto Monocyte % : 5.3 %  Auto Eosinophil % : 1.7 %  Auto Basophil % : 0.9 %    PT/INR - ( 02 Feb 2024 14:37 )   PT: 11.3 sec;   INR: 0.99          PTT - ( 02 Feb 2024 14:37 )  PTT:31.0 sec    02-03    140  |  104  |  5<L>  ----------------------------<  99  3.9   |  26  |  0.56    Ca    9.6      03 Feb 2024 14:30  Phos  3.3     02-03  Mg     2.0     02-03    TPro  7.2  /  Alb  4.2  /  TBili  2.1<H>  /  DBili  x   /  AST  293<H>  /  ALT  734<H>  /  AlkPhos  369<H>  02-03      Urinalysis Basic - ( 03 Feb 2024 14:30 )    Color: x / Appearance: x / SG: x / pH: x  Gluc: 99 mg/dL / Ketone: x  / Bili: x / Urobili: x   Blood: x / Protein: x / Nitrite: x   Leuk Esterase: x / RBC: x / WBC x   Sq Epi: x / Non Sq Epi: x / Bacteria: x        Other Labs:    Cultures:    Pathology:    Imaging Studies:       Hematology Oncology Consult Note     MARY BRYANT is a 28 year old female with PMH anxiety who presents with nausea, abdominal pain, back pain, subjective fevers, and emesis for 1 week. Hematology is consulted for concern for hemolytic anemia    Patient presented to [facility] ED 3 days prior with similar complaints, where she states she was febrile to "100 point something."  Symptoms had not resolved, and so she represented today. Patient reports she recently traveled to [place] and embarked on many activities including hiking, swimming, beach. She also reports she was bitten by mosquitos while there. She went with her partner, who reportedly did not experience any symptoms. Patient also reports that she has been having night sweats over the last week, though no weight loss.    FH  Reports grandfather had a unknown blood disorder     SH  smokes marijuana, not cigarettes, non drinker, no drug use    Labs significant for WBC 6.81, Hgb 11.3, Platelet 161.   Haptoglobin < 10  Bilirubin 1.9 --> 2.1.   SILVERIO negative  LFTs - , ,     CT imaging notable for mild hepatosplenomegaly    PAST MEDICAL & SURGICAL HISTORY:      Allergies:  No Known Allergies      Medications:        Social History:    FAMILY HISTORY:      PHYSICAL EXAM:    T(F): 98.8 (02-03-24 @ 15:38), Max: 98.8 (02-03-24 @ 15:38)  HR: 96 (02-03-24 @ 15:38) (69 - 96)  BP: 102/68 (02-03-24 @ 15:38) (102/68 - 131/85)  RR: 18 (02-03-24 @ 15:38) (18 - 18)  SpO2: 97% (02-03-24 @ 15:38) (97% - 99%)  Wt(kg): --    Daily     Daily     Gen: well developed, well nourished, comfortable  HEENT: normocephalic/atraumatic, no conjunctival pallor, no scleral icterus, no oral thrush/mucosal bleeding/mucositis  Neck: supple, no masses, no JVD  Breasts: deferred  Back: nontender  Cardiovascular: RR, nl S1S2, no murmurs/rubs/gallops  Respiratory: clear air entry b/l  Gastrointestinal: BS+, soft, NT/ND, no masses, no splenomegaly, no hepatomegaly, no evidence for ascites  Extremities: no clubbing/cyanosis, no edema, no calf tenderness  Vascular:  DP/PT 2+ b/l  Neurological: CN 2-12 grossly intact, no focal deficits  Skin: no rash on visible skin  Lymph Nodes:  no cervical/supraclavicular LAD, no axillary/groin LAD  Musculoskeletal:  full ROM  Psychiatric:  mood stable            Labs:                          11.3   6.81  )-----------( 161      ( 03 Feb 2024 07:19 )             33.9     CBC Full  -  ( 03 Feb 2024 07:19 )  WBC Count : 6.81 K/uL  RBC Count : 3.73 M/uL  Hemoglobin : 11.3 g/dL  Hematocrit : 33.9 %  Platelet Count - Automated : 161 K/uL  Mean Cell Volume : 90.9 fl  Mean Cell Hemoglobin : 30.3 pg  Mean Cell Hemoglobin Concentration : 33.3 gm/dL  Auto Neutrophil # : 2.81 K/uL  Auto Lymphocyte # : 3.41 K/uL  Auto Monocyte # : 0.36 K/uL  Auto Eosinophil # : 0.12 K/uL  Auto Basophil # : 0.06 K/uL  Auto Neutrophil % : 40.3 %  Auto Lymphocyte % : 50.0 %  Auto Monocyte % : 5.3 %  Auto Eosinophil % : 1.7 %  Auto Basophil % : 0.9 %    PT/INR - ( 02 Feb 2024 14:37 )   PT: 11.3 sec;   INR: 0.99          PTT - ( 02 Feb 2024 14:37 )  PTT:31.0 sec    02-03    140  |  104  |  5<L>  ----------------------------<  99  3.9   |  26  |  0.56    Ca    9.6      03 Feb 2024 14:30  Phos  3.3     02-03  Mg     2.0     02-03    TPro  7.2  /  Alb  4.2  /  TBili  2.1<H>  /  DBili  x   /  AST  293<H>  /  ALT  734<H>  /  AlkPhos  369<H>  02-03      Urinalysis Basic - ( 03 Feb 2024 14:30 )    Color: x / Appearance: x / SG: x / pH: x  Gluc: 99 mg/dL / Ketone: x  / Bili: x / Urobili: x   Blood: x / Protein: x / Nitrite: x   Leuk Esterase: x / RBC: x / WBC x   Sq Epi: x / Non Sq Epi: x / Bacteria: x        Other Labs:    Cultures:    Pathology:    Imaging Studies:

## 2024-02-03 NOTE — PROGRESS NOTE ADULT - SUBJECTIVE AND OBJECTIVE BOX
Patient is a 28y old  Female who presents with a chief complaint of Jaundice (02 Feb 2024 07:21)    INTERVAL EVENTS: FAYE    SUBJECTIVE:  Patient was seen and examined at bedside. Patient reports improvement of her symptoms although still has LUQ pain.     Review of systems: Patient denies: fever, chills, dizziness, HA, Changes in vision, CP, dyspnea, nausea or vomiting, dysuria, changes in bowel movements, LE edema. Rest of 12 point Review of systems negative unless otherwise documented elsewhere in note.     Diet, Regular (02-03-24 @ 09:59) [Active]      MEDICATIONS:  MEDICATIONS  (STANDING):  escitalopram 20 milliGRAM(s) Oral daily  influenza   Vaccine 0.5 milliLiter(s) IntraMuscular once    MEDICATIONS  (PRN):  acetaminophen     Tablet .. 650 milliGRAM(s) Oral every 6 hours PRN Temp greater or equal to 38C (100.4F), Mild Pain (1 - 3)  aluminum hydroxide/magnesium hydroxide/simethicone Suspension 30 milliLiter(s) Oral every 4 hours PRN Dyspepsia  melatonin 3 milliGRAM(s) Oral at bedtime PRN Insomnia  ondansetron Injectable 4 milliGRAM(s) IV Push every 8 hours PRN Nausea and/or Vomiting      Allergies    No Known Allergies    Intolerances        OBJECTIVE:  Vital Signs Last 24 Hrs  T(C): 36.7 (03 Feb 2024 05:47), Max: 37 (02 Feb 2024 20:50)  T(F): 98 (03 Feb 2024 05:47), Max: 98.6 (02 Feb 2024 20:50)  HR: 69 (03 Feb 2024 05:47) (69 - 88)  BP: 109/71 (03 Feb 2024 05:47) (109/71 - 131/85)  BP(mean): --  RR: 18 (03 Feb 2024 05:47) (18 - 18)  SpO2: 99% (03 Feb 2024 05:47) (97% - 99%)    Parameters below as of 03 Feb 2024 05:47  Patient On (Oxygen Delivery Method): room air      I&O's Summary      PHYSICAL EXAM:  Gen: Reclining in bed at time of exam, appears stated age  HEENT: NCAT, MMM, clear OP  Neck: supple, trachea at midline  CV: RRR, +S1/S2  Pulm: adequate respiratory effort, no increase in work of breathing  Abd: soft, non distended, LUQ TTP  Skin: warm and dry, no new rashes vs prior report  Ext: WWP, no LE edema  Neuro: AOx3, no gross focal neurological deficits  Psych: affect and behavior appropriate, pleasant at time of interview    LABS:                        11.3   6.81  )-----------( 161      ( 03 Feb 2024 07:19 )             33.9     02-03    140  |  105  |  5<L>  ----------------------------<  89  4.7   |  27  |  0.65    Ca    8.9      03 Feb 2024 07:19  Phos  3.3     02-03  Mg     2.0     02-03    TPro  6.3  /  Alb  3.5  /  TBili  1.9<H>  /  DBili  1.1<H>  /  AST  297<H>  /  ALT  632<H>  /  AlkPhos  304<H>  02-03    LIVER FUNCTIONS - ( 03 Feb 2024 07:19 )  Alb: 3.5 g/dL / Pro: 6.3 g/dL / ALK PHOS: 304 U/L / ALT: 632 U/L / AST: 297 U/L / GGT: 187 U/L       PT/INR - ( 02 Feb 2024 14:37 )   PT: 11.3 sec;   INR: 0.99          PTT - ( 02 Feb 2024 14:37 )  PTT:31.0 sec  CAPILLARY BLOOD GLUCOSE        Urinalysis Basic - ( 03 Feb 2024 07:19 )    Color: x / Appearance: x / SG: x / pH: x  Gluc: 89 mg/dL / Ketone: x  / Bili: x / Urobili: x   Blood: x / Protein: x / Nitrite: x   Leuk Esterase: x / RBC: x / WBC x   Sq Epi: x / Non Sq Epi: x / Bacteria: x        MICRODATA:    Culture - Urine (collected 02 Feb 2024 01:24)  Source: Clean Catch Clean Catch (Midstream)  Final Report (03 Feb 2024 09:10):    <10,000 CFU/mL Normal Urogenital Danette    Culture - Blood (collected 01 Feb 2024 23:46)  Source: .Blood Blood  Preliminary Report (03 Feb 2024 06:01):    No growth at 24 hours    Culture - Blood (collected 01 Feb 2024 23:46)  Source: .Blood Blood  Preliminary Report (03 Feb 2024 06:01):    No growth at 24 hours        RADIOLOGY/OTHER STUDIES:

## 2024-02-03 NOTE — CONSULT NOTE ADULT - ASSESSMENT
****INCOMPLETE NOTE*****    #C/F Hemolytic Anemia  #Hepatosplenomegaly  #Transaminitis   - ddx intrinsic hepatotoxicity includes infectious/viral vs lymphoproliferative disorder vs less likely autoimmune given negative SILVERIO. Extravascular hemolysis is also possible given hepatosplenemegaly, though less likely given negative IgG on angela test (G6PD can also cause extravascular hemolysis however). HLH is also a consideration, though clinically these patients appear much worse than current status. Patient's reticulocyte index is is decreased at 1.24, which is atypical for hemolysis. Please also note that haptoglobin can be decreased and LDH can be increaesed in hepatic dysfunction and so this may not be accurately reflecting hemolysis in this scenario     Plan  -can send for peripheral blood flow cytometry (indication "r/o leukemia/lymphoma and PNH. Please test for CD55 OR 59). Please note flow cytometry must be accompanied by a requisition form which will print to the nearest printer (labels will print to patient location).  - please check iron studies including ferritin level   - would check urine hemosiderin  - would check EBV, CMV, Babesia TSH, triglycerides b12 folate  - if above workup negative, given young age, can consider G6PD testing and Pyruvate kinase testing as well   - peripheral smear evaluated by fellow   - trend hemolysis labs daily (LDH, haptoglobin, bilirubin, reticulocyte count)  - would consider hepatology consult if transaminitis not improving ****INCOMPLETE NOTE*****    #C/F Hemolytic Anemia  #Hepatosplenomegaly  #Transaminitis   - ddx intrinsic hepatotoxicity includes infectious/viral vs lymphoproliferative disorder vs less likely autoimmune given negative SILVERIO. Extravascular hemolysis is also possible given hepatosplenemegaly, though less likely given negative IgG on angela test (G6PD can also cause extravascular hemolysis however). HLH is also a consideration, though clinically these patients appear much worse than current status. Patient's reticulocyte index is is decreased at 1.24, which is atypical for hemolysis. Please also note that haptoglobin can be decreased and LDH can be increaesed in hepatic dysfunction and so this may not be accurately reflecting hemolysis in this scenario     Plan  -can send for peripheral blood flow cytometry (indication "r/o leukemia/lymphoma and PNH. Please test for CD55 OR 59). Please note flow cytometry must be accompanied by a requisition form which will print to the nearest printer (labels will print to patient location).  - please check iron studies including ferritin level   - would check urine hemosiderin  - would check EBV, CMV, TSH, triglycerides b12 folate; consider tick borne disease (Anaplasma, Babesia, Ehrlichiosis) given recent travel  - if above workup negative, given young age, can consider G6PD testing and Pyruvate kinase testing as well   - peripheral smear evaluated by fellow   - trend hemolysis labs daily (LDH, haptoglobin, bilirubin, reticulocyte count)  - would consider hepatology consult if transaminitis not improving ****INCOMPLETE NOTE*****    #C/F Hemolytic Anemia  #Hepatosplenomegaly  #Transaminitis   - ddx intrinsic hepatotoxicity includes infectious/viral vs lymphoproliferative disorder vs less likely autoimmune given negative SILVERIO. Extravascular hemolysis is also possible given hepatosplenemegaly, though less likely given negative IgG on angela test (G6PD can also cause extravascular hemolysis however). HLH is also a consideration, though clinically these patients appear much worse than current status. Patient's reticulocyte index is is decreased at 1.24, which is atypical for hemolysis. Please also note that haptoglobin can be decreased and LDH can be increaesed in hepatic dysfunction and so this may not be accurately reflecting hemolysis in this scenario     Plan  -can send for peripheral blood flow cytometry (indication "r/o leukemia/lymphoma and PNH. Please test for CD55 OR 59). Please note flow cytometry must be accompanied by a requisition form which will print to the nearest printer (labels will print to patient location).  - please check iron studies including ferritin level   - would check urine hemosiderin  - would check EBV, CMV, TSH, triglycerides b12 folate; consider tick borne disease (i.e. Babesia) given recent travel  - if above workup negative, given young age, can consider G6PD testing and Pyruvate kinase testing as well   - peripheral smear evaluated by fellow   - trend hemolysis labs daily (LDH, haptoglobin, bilirubin, reticulocyte count)  - would consider hepatology consult if transaminitis not improving MARY BRYANT is a 28 year old female with PMH anxiety who presents with nausea, abdominal pain, back pain, subjective fevers, and emesis for 1 week. Hematology is consulted for concern for hemolytic anemia    #C/F Hemolytic Anemia  #Hepatosplenomegaly  #Transaminitis   - ddx intrinsic hepatotoxicity vs infectious/viral vs lymphoproliferative disorder vs less likely autoimmune given negative SILVERIO. Extravascular hemolysis is also possible given hepatosplenemegaly, though less likely given negative IgG on angela test (G6PD can also cause extravascular hemolysis however). HLH is also a consideration, though clinically these patients appear much worse than current status. Patient's reticulocyte index is is decreased at 1.24, which is atypical for hemolysis. Please also note that haptoglobin can be decreased and LDH can be increased in hepatic dysfunction and so this may not be accurately reflecting hemolysis in this scenario     Plan  -can send for peripheral blood flow cytometry (indication "r/o leukemia/lymphoma and PNH. Please test for CD55 OR 59). Please note flow cytometry must be accompanied by a requisition form which will print to the nearest printer (labels will print to patient location).  - please check iron studies including ferritin level   - would check urine hemosiderin  - would check EBV, CMV, TSH, triglycerides b12 folate; consider tick borne disease (i.e. Babesia), Dengue given recent travel  - would obtain type and screen to evaluate for any other autoantibodies  - if above workup negative, given young age, can consider G6PD testing and Pyruvate kinase testing  - will evaluate peripheral smear  - trend hemolysis labs daily (LDH, haptoglobin, bilirubin, reticulocyte count)  - would consider hepatology consult if transaminitis not improving    To be discussed with Dr. Emery MARY BRYANT is a 28 year old female with PMH anxiety who presents with nausea, abdominal pain, back pain, subjective fevers, and emesis for 1 week. Hematology is consulted for concern for hemolytic anemia    #C/F Hemolytic Anemia  #Hepatosplenomegaly  #Transaminitis   #Mild Lymphocytosis   - ddx intrinsic hepatotoxicity vs infectious/viral vs lymphoproliferative disorder vs less likely autoimmune given negative SILVERIO. Extravascular hemolysis is also possible given hepatosplenemegaly, though less likely given negative IgG on angela test (G6PD can also cause extravascular hemolysis however). HLH is also a consideration, though clinically these patients appear much worse than current status. Patient's reticulocyte index is is decreased at 1.24, which is atypical for hemolysis. Please also note that haptoglobin can be decreased and LDH can be increased in hepatic dysfunction and so this may not be accurately reflecting hemolysis in this scenario. Overall suspect infectious/viral component given clinical picture.    Plan  -can send for peripheral blood flow cytometry (indication "r/o leukemia/lymphoma and PNH. Please test for CD55 OR 59). Please note flow cytometry must be accompanied by a requisition form which will print to the nearest printer (labels will print to patient location).  - please check iron studies including ferritin level   - would check urine hemosiderin  - would check EBV, CMV, TSH, triglycerides b12 folate; consider tick borne disease (i.e. Babesia), Dengue given recent travel  - would obtain type and screen to evaluate for any other autoantibodies  - if above workup negative, given young age, can consider G6PD testing and Pyruvate kinase testing  - peripheral smear evaluated by fellow 2/3/24 - rare schistocytes, activated LGLs present, few giant platelets, rare acanthocytes; smudge cell spresent  - trend hemolysis labs daily (LDH, haptoglobin, bilirubin, reticulocyte count)  - would consider hepatology consult if transaminitis not improving    To be discussed with Dr. Emery MARY BRYANT is a 28 year old female with PMH anxiety who presents with nausea, abdominal pain, back pain, subjective fevers, and emesis for 1 week. Hematology is consulted for concern for hemolytic anemia    #C/F Hemolytic Anemia  #Hepatosplenomegaly  #Transaminitis   #Mild Lymphocytosis   - ddx intrinsic hepatotoxicity vs infectious/viral vs lymphoproliferative disorder vs less likely autoimmune given negative SILVERIO. Extravascular hemolysis is also possible given hepatosplenemegaly, though less likely given negative IgG on angela test (G6PD can also cause extravascular hemolysis however). HLH is also a consideration, though clinically these patients appear much worse than current status. Patient's reticulocyte index is is decreased at 1.24, which is atypical for hemolysis. Please also note that haptoglobin can be decreased and LDH can be increased in hepatic dysfunction and so this may not be accurately reflecting hemolysis in this scenario. Overall suspect infectious/viral component given clinical picture.    Plan  -can send for peripheral blood flow cytometry (indication "r/o leukemia/lymphoma and PNH. Please test for CD55 OR 59). Please note flow cytometry must be accompanied by a requisition form which will print to the nearest printer (labels will print to patient location).  - please check iron studies including ferritin level   - would check urine hemosiderin  - would check HIV,Hepatitis, EBV, CMV, TSH, triglycerides b12 folate; consider tick borne disease (i.e. Babesia), Dengue given recent travel  - would obtain type and screen to evaluate for any other autoantibodies  - if above workup negative, given young age, can consider G6PD testing and Pyruvate kinase testing  - peripheral smear evaluated by fellow 2/3/24 - rare schistocytes, activated LGLs present, few giant platelets, rare acanthocytes; smudge cell spresent  - trend hemolysis labs daily (LDH, haptoglobin, bilirubin, reticulocyte count)  - would consider hepatology consult if transaminitis not improving    To be discussed with Dr. Emery

## 2024-02-03 NOTE — PROGRESS NOTE ADULT - PROBLEM SELECTOR PLAN 3
Appears 2/2 hemolysis  Pt w/ abdominal pain, after recent trip to [place]. Found to have direct hyperbilirubinemia. Endorses darker urine and light stools.     -trend fractionated bilirubin   -w/u as above

## 2024-02-04 ENCOUNTER — TRANSCRIPTION ENCOUNTER (OUTPATIENT)
Age: 29
End: 2024-02-04

## 2024-02-04 VITALS
DIASTOLIC BLOOD PRESSURE: 76 MMHG | RESPIRATION RATE: 18 BRPM | OXYGEN SATURATION: 98 % | TEMPERATURE: 98 F | SYSTOLIC BLOOD PRESSURE: 113 MMHG

## 2024-02-04 LAB
ALBUMIN SERPL ELPH-MCNC: 4.3 G/DL — SIGNIFICANT CHANGE UP (ref 3.3–5)
ALP SERPL-CCNC: 375 U/L — HIGH (ref 40–120)
ALT FLD-CCNC: 564 U/L — HIGH (ref 10–45)
ANION GAP SERPL CALC-SCNC: 10 MMOL/L — SIGNIFICANT CHANGE UP (ref 5–17)
ANISOCYTOSIS BLD QL: SLIGHT — SIGNIFICANT CHANGE UP
AST SERPL-CCNC: 191 U/L — HIGH (ref 10–40)
BASOPHILS # BLD AUTO: 0.09 K/UL — SIGNIFICANT CHANGE UP (ref 0–0.2)
BASOPHILS NFR BLD AUTO: 0.9 % — SIGNIFICANT CHANGE UP (ref 0–2)
BILIRUB SERPL-MCNC: 1.6 MG/DL — HIGH (ref 0.2–1.2)
BLASTS # FLD: 0.9 % — HIGH (ref 0–0)
BLD GP AB SCN SERPL QL: NEGATIVE — SIGNIFICANT CHANGE UP
BUN SERPL-MCNC: 9 MG/DL — SIGNIFICANT CHANGE UP (ref 7–23)
CALCIUM SERPL-MCNC: 9.3 MG/DL — SIGNIFICANT CHANGE UP (ref 8.4–10.5)
CHLORIDE SERPL-SCNC: 103 MMOL/L — SIGNIFICANT CHANGE UP (ref 96–108)
CHOLEST SERPL-MCNC: 195 MG/DL — SIGNIFICANT CHANGE UP
CO2 SERPL-SCNC: 25 MMOL/L — SIGNIFICANT CHANGE UP (ref 22–31)
CREAT SERPL-MCNC: 0.58 MG/DL — SIGNIFICANT CHANGE UP (ref 0.5–1.3)
EGFR: 128 ML/MIN/1.73M2 — SIGNIFICANT CHANGE UP
EOSINOPHIL # BLD AUTO: 0 K/UL — SIGNIFICANT CHANGE UP (ref 0–0.5)
EOSINOPHIL NFR BLD AUTO: 0 % — SIGNIFICANT CHANGE UP (ref 0–6)
FERRITIN SERPL-MCNC: 112 NG/ML — SIGNIFICANT CHANGE UP (ref 15–150)
FOLATE SERPL-MCNC: 8.4 NG/ML — SIGNIFICANT CHANGE UP
GIANT PLATELETS BLD QL SMEAR: PRESENT — SIGNIFICANT CHANGE UP
GLUCOSE SERPL-MCNC: 97 MG/DL — SIGNIFICANT CHANGE UP (ref 70–99)
HAPTOGLOB SERPL-MCNC: 13 MG/DL — LOW (ref 34–200)
HAV IGM SER-ACNC: SIGNIFICANT CHANGE UP
HBV CORE AB SER-ACNC: SIGNIFICANT CHANGE UP
HBV CORE IGM SER-ACNC: SIGNIFICANT CHANGE UP
HBV SURFACE AB SER-ACNC: SIGNIFICANT CHANGE UP
HBV SURFACE AG SER-ACNC: SIGNIFICANT CHANGE UP
HCT VFR BLD CALC: 37.8 % — SIGNIFICANT CHANGE UP (ref 34.5–45)
HCV AB S/CO SERPL IA: 0.04 S/CO — SIGNIFICANT CHANGE UP
HCV AB SERPL-IMP: SIGNIFICANT CHANGE UP
HDLC SERPL-MCNC: 31 MG/DL — LOW
HGB BLD-MCNC: 12.8 G/DL — SIGNIFICANT CHANGE UP (ref 11.5–15.5)
HIV 1+2 AB+HIV1 P24 AG SERPL QL IA: SIGNIFICANT CHANGE UP
IRON SATN MFR SERPL: 17 % — SIGNIFICANT CHANGE UP (ref 14–50)
IRON SATN MFR SERPL: 56 UG/DL — SIGNIFICANT CHANGE UP (ref 30–160)
LDH SERPL L TO P-CCNC: 403 U/L — HIGH (ref 50–242)
LIPID PNL WITH DIRECT LDL SERPL: 123 MG/DL — HIGH
LYMPHOCYTES # BLD AUTO: 3.8 K/UL — HIGH (ref 1–3.3)
LYMPHOCYTES # BLD AUTO: 37.7 % — SIGNIFICANT CHANGE UP (ref 13–44)
MAGNESIUM SERPL-MCNC: 2.2 MG/DL — SIGNIFICANT CHANGE UP (ref 1.6–2.6)
MANUAL SMEAR VERIFICATION: SIGNIFICANT CHANGE UP
MCHC RBC-ENTMCNC: 30.5 PG — SIGNIFICANT CHANGE UP (ref 27–34)
MCHC RBC-ENTMCNC: 33.9 GM/DL — SIGNIFICANT CHANGE UP (ref 32–36)
MCV RBC AUTO: 90.2 FL — SIGNIFICANT CHANGE UP (ref 80–100)
MICROCYTES BLD QL: SLIGHT — SIGNIFICANT CHANGE UP
MITOCHONDRIA AB SER-ACNC: SIGNIFICANT CHANGE UP
MONOCYTES # BLD AUTO: 0.62 K/UL — SIGNIFICANT CHANGE UP (ref 0–0.9)
MONOCYTES NFR BLD AUTO: 6.1 % — SIGNIFICANT CHANGE UP (ref 2–14)
NEUTROPHILS # BLD AUTO: 2.84 K/UL — SIGNIFICANT CHANGE UP (ref 1.8–7.4)
NEUTROPHILS NFR BLD AUTO: 28.1 % — LOW (ref 43–77)
NON HDL CHOLESTEROL: 164 MG/DL — HIGH
OVALOCYTES BLD QL SMEAR: SLIGHT — SIGNIFICANT CHANGE UP
PHOSPHATE SERPL-MCNC: 3.3 MG/DL — SIGNIFICANT CHANGE UP (ref 2.5–4.5)
PLAT MORPH BLD: ABNORMAL
PLATELET # BLD AUTO: 201 K/UL — SIGNIFICANT CHANGE UP (ref 150–400)
POIKILOCYTOSIS BLD QL AUTO: SLIGHT — SIGNIFICANT CHANGE UP
POLYCHROMASIA BLD QL SMEAR: SLIGHT — SIGNIFICANT CHANGE UP
POTASSIUM SERPL-MCNC: 4.1 MMOL/L — SIGNIFICANT CHANGE UP (ref 3.5–5.3)
POTASSIUM SERPL-SCNC: 4.1 MMOL/L — SIGNIFICANT CHANGE UP (ref 3.5–5.3)
PROT SERPL-MCNC: 7.5 G/DL — SIGNIFICANT CHANGE UP (ref 6–8.3)
RBC # BLD: 4.19 M/UL — SIGNIFICANT CHANGE UP (ref 3.8–5.2)
RBC # BLD: 4.19 M/UL — SIGNIFICANT CHANGE UP (ref 3.8–5.2)
RBC # FLD: 14.5 % — SIGNIFICANT CHANGE UP (ref 10.3–14.5)
RBC BLD AUTO: ABNORMAL
RETICS #: 126.5 K/UL — HIGH (ref 25–125)
RETICS/RBC NFR: 3 % — HIGH (ref 0.5–2.5)
RH IG SCN BLD-IMP: NEGATIVE — SIGNIFICANT CHANGE UP
SMUDGE CELLS # BLD: PRESENT — SIGNIFICANT CHANGE UP
SODIUM SERPL-SCNC: 138 MMOL/L — SIGNIFICANT CHANGE UP (ref 135–145)
SPHEROCYTES BLD QL SMEAR: SLIGHT — SIGNIFICANT CHANGE UP
TIBC SERPL-MCNC: 325 UG/DL — SIGNIFICANT CHANGE UP (ref 220–430)
TRIGL SERPL-MCNC: 203 MG/DL — HIGH
TRIGL SERPL-MCNC: 204 MG/DL — HIGH
TSH SERPL-MCNC: 11.12 UIU/ML — HIGH (ref 0.27–4.2)
TSH SERPL-MCNC: 8.96 UIU/ML — HIGH (ref 0.27–4.2)
UIBC SERPL-MCNC: 269 UG/DL — SIGNIFICANT CHANGE UP (ref 110–370)
VARIANT LYMPHS # BLD: 26.3 % — HIGH (ref 0–6)
VIT B12 SERPL-MCNC: >2000 PG/ML — HIGH (ref 232–1245)
WBC # BLD: 10.09 K/UL — SIGNIFICANT CHANGE UP (ref 3.8–10.5)
WBC # FLD AUTO: 10.09 K/UL — SIGNIFICANT CHANGE UP (ref 3.8–10.5)

## 2024-02-04 PROCEDURE — 83550 IRON BINDING TEST: CPT

## 2024-02-04 PROCEDURE — 87207 SMEAR SPECIAL STAIN: CPT

## 2024-02-04 PROCEDURE — 82746 ASSAY OF FOLIC ACID SERUM: CPT

## 2024-02-04 PROCEDURE — 87040 BLOOD CULTURE FOR BACTERIA: CPT

## 2024-02-04 PROCEDURE — 86618 LYME DISEASE ANTIBODY: CPT

## 2024-02-04 PROCEDURE — 85730 THROMBOPLASTIN TIME PARTIAL: CPT

## 2024-02-04 PROCEDURE — 86381 MITOCHONDRIAL ANTIBODY EACH: CPT

## 2024-02-04 PROCEDURE — 86880 COOMBS TEST DIRECT: CPT

## 2024-02-04 PROCEDURE — 87637 SARSCOV2&INF A&B&RSV AMP PRB: CPT

## 2024-02-04 PROCEDURE — 80074 ACUTE HEPATITIS PANEL: CPT

## 2024-02-04 PROCEDURE — 86706 HEP B SURFACE ANTIBODY: CPT

## 2024-02-04 PROCEDURE — 86803 HEPATITIS C AB TEST: CPT

## 2024-02-04 PROCEDURE — 86709 HEPATITIS A IGM ANTIBODY: CPT

## 2024-02-04 PROCEDURE — 86703 HIV-1/HIV-2 1 RESULT ANTBDY: CPT

## 2024-02-04 PROCEDURE — 82247 BILIRUBIN TOTAL: CPT

## 2024-02-04 PROCEDURE — 80307 DRUG TEST PRSMV CHEM ANLYZR: CPT

## 2024-02-04 PROCEDURE — 74177 CT ABD & PELVIS W/CONTRAST: CPT

## 2024-02-04 PROCEDURE — 83010 ASSAY OF HAPTOGLOBIN QUANT: CPT

## 2024-02-04 PROCEDURE — 83605 ASSAY OF LACTIC ACID: CPT

## 2024-02-04 PROCEDURE — 87340 HEPATITIS B SURFACE AG IA: CPT

## 2024-02-04 PROCEDURE — 76700 US EXAM ABDOM COMPLETE: CPT

## 2024-02-04 PROCEDURE — 84478 ASSAY OF TRIGLYCERIDES: CPT

## 2024-02-04 PROCEDURE — 82977 ASSAY OF GGT: CPT

## 2024-02-04 PROCEDURE — 86850 RBC ANTIBODY SCREEN: CPT

## 2024-02-04 PROCEDURE — 86705 HEP B CORE ANTIBODY IGM: CPT

## 2024-02-04 PROCEDURE — 82607 VITAMIN B-12: CPT

## 2024-02-04 PROCEDURE — 83615 LACTATE (LD) (LDH) ENZYME: CPT

## 2024-02-04 PROCEDURE — 76700 US EXAM ABDOM COMPLETE: CPT | Mod: 26

## 2024-02-04 PROCEDURE — 80053 COMPREHEN METABOLIC PANEL: CPT

## 2024-02-04 PROCEDURE — 99239 HOSP IP/OBS DSCHRG MGMT >30: CPT | Mod: GC

## 2024-02-04 PROCEDURE — 84702 CHORIONIC GONADOTROPIN TEST: CPT

## 2024-02-04 PROCEDURE — 85007 BL SMEAR W/DIFF WBC COUNT: CPT

## 2024-02-04 PROCEDURE — 81001 URINALYSIS AUTO W/SCOPE: CPT

## 2024-02-04 PROCEDURE — 84443 ASSAY THYROID STIM HORMONE: CPT

## 2024-02-04 PROCEDURE — 85025 COMPLETE CBC W/AUTO DIFF WBC: CPT

## 2024-02-04 PROCEDURE — 36415 COLL VENOUS BLD VENIPUNCTURE: CPT

## 2024-02-04 PROCEDURE — 87799 DETECT AGENT NOS DNA QUANT: CPT

## 2024-02-04 PROCEDURE — 82550 ASSAY OF CK (CPK): CPT

## 2024-02-04 PROCEDURE — 83735 ASSAY OF MAGNESIUM: CPT

## 2024-02-04 PROCEDURE — 85027 COMPLETE CBC AUTOMATED: CPT

## 2024-02-04 PROCEDURE — 83690 ASSAY OF LIPASE: CPT

## 2024-02-04 PROCEDURE — 86704 HEP B CORE ANTIBODY TOTAL: CPT

## 2024-02-04 PROCEDURE — 99285 EMERGENCY DEPT VISIT HI MDM: CPT

## 2024-02-04 PROCEDURE — 82728 ASSAY OF FERRITIN: CPT

## 2024-02-04 PROCEDURE — 87389 HIV-1 AG W/HIV-1&-2 AB AG IA: CPT

## 2024-02-04 PROCEDURE — 86753 PROTOZOA ANTIBODY NOS: CPT

## 2024-02-04 PROCEDURE — 83540 ASSAY OF IRON: CPT

## 2024-02-04 PROCEDURE — 84100 ASSAY OF PHOSPHORUS: CPT

## 2024-02-04 PROCEDURE — 86901 BLOOD TYPING SEROLOGIC RH(D): CPT

## 2024-02-04 PROCEDURE — 80061 LIPID PANEL: CPT

## 2024-02-04 PROCEDURE — 82248 BILIRUBIN DIRECT: CPT

## 2024-02-04 PROCEDURE — 86666 EHRLICHIA ANTIBODY: CPT

## 2024-02-04 PROCEDURE — 86900 BLOOD TYPING SEROLOGIC ABO: CPT

## 2024-02-04 PROCEDURE — 82803 BLOOD GASES ANY COMBINATION: CPT

## 2024-02-04 PROCEDURE — 86790 VIRUS ANTIBODY NOS: CPT

## 2024-02-04 PROCEDURE — 86720 LEPTOSPIRA ANTIBODY: CPT

## 2024-02-04 PROCEDURE — 87086 URINE CULTURE/COLONY COUNT: CPT

## 2024-02-04 PROCEDURE — 85045 AUTOMATED RETICULOCYTE COUNT: CPT

## 2024-02-04 PROCEDURE — 85610 PROTHROMBIN TIME: CPT

## 2024-02-04 RX ORDER — IBUPROFEN 200 MG
200 TABLET ORAL ONCE
Refills: 0 | Status: DISCONTINUED | OUTPATIENT
Start: 2024-02-04 | End: 2024-02-04

## 2024-02-04 RX ADMIN — ESCITALOPRAM OXALATE 20 MILLIGRAM(S): 10 TABLET, FILM COATED ORAL at 12:25

## 2024-02-04 RX ADMIN — Medication 650 MILLIGRAM(S): at 12:24

## 2024-02-04 NOTE — PROGRESS NOTE ADULT - PROBLEM SELECTOR PLAN 1
Suspect 2/2 infectious process  -Trend CBC, LDH, Haptoglobin  -Check Lyme  -Splenomegaly seen on CT  -Pending US
Suspect 2/2 infectious process  -Trend CBC, LDH, Haptoglobin  -Check Lyme  -Splenomegaly seen on CT  -Pending US

## 2024-02-04 NOTE — DISCHARGE NOTE PROVIDER - CARE PROVIDERS DIRECT ADDRESSES
[email or web address]

## 2024-02-04 NOTE — DISCHARGE NOTE PROVIDER - NSDCFUADDAPPT_GEN_ALL_CORE_FT
Please bring your Insurance card, Photo ID and Discharge paperwork to the following appointment:    (1) Please follow up with [facility] with Dr. Brandie Smith on behalf of Dr. Rachele Ortiz at [address] [place] [address], [place] [address] on 2/9/2024 at 10:30am.    Appointment was scheduled by Ms. MORENO Bradford, Referral Coordinator.   Please bring your Insurance card, Photo ID and Discharge paperwork to the following appointments:    (1) Please follow up with [facility] with Dr. Brandie Smith on behalf of Dr. Rachele Ortiz at [address], [address], [place] [address] on 2/9/2024 at 10:30am.    Appointment was scheduled by Ms. MORENO Bradford, Referral Coordinator.    (2) Please follow up with your Hematology Provider, Dr. Laura Shukla at [address], [address], [place] [address] on 2/9/2024 at 1:30pm.    Appointment was scheduled by Ms. MORENO Bradford, Referral Coordinator.

## 2024-02-04 NOTE — PROGRESS NOTE ADULT - PROBLEM SELECTOR PLAN 4
Hx of anxiety  Home med: lexapro 20 qd    -c home med
Hx of anxiety  Home med: lexapro 20 qd    -c home med

## 2024-02-04 NOTE — DISCHARGE NOTE PROVIDER - PROVIDER TOKENS
PROVIDER:[TOKEN:[[id number]],FOLLOWUP:[1 week]],PROVIDER:[TOKEN:[[id number]],FOLLOWUP:[1 week]] PROVIDER:[TOKEN:[[id number]],SCHEDULEDAPPT:[02/09/2024],SCHEDULEDAPPTTIME:[10:30 AM]],PROVIDER:[TOKEN:[[id number]],FOLLOWUP:[1 week]] PROVIDER:[TOKEN:[[id number]],SCHEDULEDAPPT:[02/09/2024],SCHEDULEDAPPTTIME:[10:30 AM]],PROVIDER:[[id number]],SCHEDULEDAPPT:[02/09/2024],SCHEDULEDAPPTTIME:[01:30 PM]]

## 2024-02-04 NOTE — DISCHARGE NOTE NURSING/CASE MANAGEMENT/SOCIAL WORK - PATIENT PORTAL LINK FT
You can access the FollowMyHealth Patient Portal offered by [facility] by registering at the following website: [email or web address]. By joining [employer]s FollowMyHealth portal, you will also be able to view your health information using other applications (apps) compatible with our system.

## 2024-02-04 NOTE — DISCHARGE NOTE NURSING/CASE MANAGEMENT/SOCIAL WORK - NSDCPEFALRISK_GEN_ALL_CORE
For information on Fall & Injury Prevention, visit: [email or web address] OR  [email or web address] OR  https://www.cdc.gov/steadi/patient.html

## 2024-02-04 NOTE — DISCHARGE NOTE PROVIDER - CARE PROVIDER_API CALL
Rachele Ortiz)  Internal Medicine  [address]  [place] [address]  Phone: [phone]  Fax: [phone]  Follow Up Time: 1 week    Laura Shukla  Hematology  [address], [address]  [place] [address]  Phone: [phone]  Fax: [phone]  Follow Up Time: 1 week   Rachele Ortiz)  Internal Medicine  [address]  [place] [address]  Phone: [phone]  Fax: [phone]  Scheduled Appointment: 02/09/2024 10:30 AM    Laura Shukla  Hematology  [address], [address]  [place] [address]  Phone: [phone]  Fax: [phone]  Follow Up Time: 1 week   Rachele Ortiz)  Internal Medicine  [address]  [place] [address]  Phone: [phone]  Fax: [phone]  Scheduled Appointment: 02/09/2024 10:30 AM    Laura Shukla  Hematology  [address]  [place] [address]  Phone: [phone]  Fax: [phone]  Scheduled Appointment: 02/09/2024 01:30 PM

## 2024-02-04 NOTE — PROGRESS NOTE ADULT - ASSESSMENT
29YO F presenting with nausea, abdominal pain, and inability to tolerate solid foods after a recent trip to [place]. Found to have hepatosplenomegaly and elevated LFTs likely 2/2 infectious process. 
27YO F presenting with nausea, abdominal pain, and inability to tolerate solid foods after a recent trip to [place]. Found to have hepatosplenomegaly and elevated LFTs likely 2/2 infectious process.

## 2024-02-04 NOTE — DISCHARGE NOTE PROVIDER - NSDCCPCAREPLAN_GEN_ALL_CORE_FT
PRINCIPAL DISCHARGE DIAGNOSIS  Diagnosis: Hepatosplenomegaly  Assessment and Plan of Treatment: Hepatosplenomegaly refers to swelling and enlargement of the liver and spleen, in the upper abdomen. It can result from an infection, an injury, some types of anemia, and various other health factors.  Medical conditions related to the liver often begin with the prefix “hepat-” (such as hepatitis) and “splen” refers to the spleen. The term “megaly” indicates that something is abnormally large.  When the liver and spleen are enlarged, they cannot function as well as usual.  Both of these organs are critical to the normal function of your hematologic system, which makes and circulates different kinds of blood cells and other bloodstream products. Several conditions can cause hepatosplenomegaly. In your case, providers think you likely contracted a viral infection that affected your liver and spleen. Though most of your infectious workup is negative so far, there are still a few tests for which results are pending. This is one of the reasons it is important for you to follow up with your medical doctors within the next week. The other reason is to ensure that further lab tests are collected, to ensure that you are continuing to get better from your condition.

## 2024-02-04 NOTE — PROGRESS NOTE ADULT - PROBLEM SELECTOR PLAN 5
F: None   E: Replete as necessary K>4 Mg>2  N: full liquid diet, advance as tolerated     DVT Prophylaxis: none needed  GI prophylaxis: None   CODE STATUS: FULL
F: None   E: Replete as necessary K>4 Mg>2  N: full liquid diet, advance as tolerated     DVT Prophylaxis: none needed  GI prophylaxis: None   CODE STATUS: FULL

## 2024-02-04 NOTE — PROGRESS NOTE ADULT - ATTENDING COMMENTS
Patient was seen and examined at bedside on 2/4/2024 at 1030 am with parents present. Patient reports improvement of her symptoms although still has persistent abdominal pain (worst at LUQ). Denies CP, SOB, abdominal pain. ROS is otherwise negative. Vitals, labwork and pertinent imaging reviewed. Physical exam - NAD, AAO x 4, PERRLA, EOMI, supple neck, chest - CTA b/l, CV - rrr, s1s2, no m/r/g, abd - soft, non distended, TTP (LUQ), + BS, ext - wwp, psych - normal affect, skin - no rash, back - midline, MSK - full ROM of all joints    Plan  -Patient appears to be improving both subjectively and from perspective of labwork  -Need to follow up infectious workup  -Need to follow up with Hematology regarding final recs  -Patient is medically ready for d/c today

## 2024-02-04 NOTE — DISCHARGE NOTE PROVIDER - NSDCFUSCHEDAPPT_GEN_ALL_CORE_FT
[facility]  INTMED [address]  Scheduled Appointment: 02/09/2024

## 2024-02-04 NOTE — PROGRESS NOTE ADULT - SUBJECTIVE AND OBJECTIVE BOX
**INCOMPLETE NOTE    OVERNIGHT EVENTS: None    SUBJECTIVE:  Patient seen and examined at bedside, comfortable, NAD. Denied fever, chest pain, dyspnea, abdominal pain.     Vital Signs Last 12 Hrs  T(F): 97.6 (02-04-24 @ 05:40), Max: 98.6 (02-03-24 @ 22:21)  HR: 80 (02-04-24 @ 05:40) (78 - 80)  BP: 99/66 (02-04-24 @ 05:40) (99/66 - 103/67)  BP(mean): --  RR: 18 (02-04-24 @ 05:40) (17 - 18)  SpO2: 94% (02-04-24 @ 05:40) (94% - 98%)  I&O's Summary      PHYSICAL EXAM:  Constitutional: NAD, comfortable in bed.  HEENT: NC/AT, PERRLA, EOMI, no conjunctival pallor or scleral icterus, MMM  Neck: Supple, no JVD  Respiratory: CTA B/L. No w/r/r.   Cardiovascular: RRR, normal S1 and S2, no m/r/g.   Gastrointestinal: +BS, soft, no distension; LUQ TTP. Mildly tender in epigastric region. Bowel sounds present   Extremities: wwp; no cyanosis, clubbing or edema.   Vascular: Pulses equal and strong throughout.   Neurological: AAOx3, no CN deficits, strength and sensation intact throughout.   Skin: No gross skin abnormalities or rashes        LABS:                        12.8   10.09 )-----------( 201      ( 04 Feb 2024 05:30 )             37.8     02-04    138  |  103  |  9   ----------------------------<  97  4.1   |  25  |  0.58    Ca    9.3      04 Feb 2024 05:30  Phos  3.3     02-04  Mg     2.2     02-04    TPro  7.5  /  Alb  4.3  /  TBili  1.6<H>  /  DBili  x   /  AST  191<H>  /  ALT  564<H>  /  AlkPhos  375<H>  02-04    PT/INR - ( 02 Feb 2024 14:37 )   PT: 11.3 sec;   INR: 0.99          PTT - ( 02 Feb 2024 14:37 )  PTT:31.0 sec  Urinalysis Basic - ( 04 Feb 2024 05:30 )    Color: x / Appearance: x / SG: x / pH: x  Gluc: 97 mg/dL / Ketone: x  / Bili: x / Urobili: x   Blood: x / Protein: x / Nitrite: x   Leuk Esterase: x / RBC: x / WBC x   Sq Epi: x / Non Sq Epi: x / Bacteria: x          RADIOLOGY & ADDITIONAL TESTS:    MEDICATIONS  (STANDING):  escitalopram 20 milliGRAM(s) Oral daily  influenza   Vaccine 0.5 milliLiter(s) IntraMuscular once    MEDICATIONS  (PRN):  acetaminophen     Tablet .. 650 milliGRAM(s) Oral every 6 hours PRN Temp greater or equal to 38C (100.4F), Mild Pain (1 - 3)  aluminum hydroxide/magnesium hydroxide/simethicone Suspension 30 milliLiter(s) Oral every 4 hours PRN Dyspepsia  melatonin 3 milliGRAM(s) Oral at bedtime PRN Insomnia  ondansetron Injectable 4 milliGRAM(s) IV Push every 8 hours PRN Nausea and/or Vomiting

## 2024-02-04 NOTE — DISCHARGE NOTE PROVIDER - ATTENDING DISCHARGE PHYSICAL EXAMINATION:
Patient was seen and examined at bedside on 2/4/2024 at 1030 am. Patient reports that she feels improved, L sided abdominal pain has improved. Is able to tolerate a diet without issue. Denies CP or SOB when she is at rest. ROS is otherwise negative. Vitals, labwork and pertinent imaging reviewed. Physical exam - NAD, AAO x 4, PERRLA, EOMI, supple neck, chest - CTA b/l, CV - rrr, s1s2, no m/r/g, abd - soft, non distended, mild TTP at LUQ, + BS, ext - wwp, psych - normal affect, skin - no rash, back - midline, MSK - full ROM of all joints    Plan  -Labwork is improving; patient's symptoms are resolving  -Suspect viral etiology of symptoms, radiographic findings and abnormal labwork; some of the infectious etiology is still pending  -Pt has close outpatient follow up with [facility] on 2/9 at 1030 am   -Patient is medically ready for d/c

## 2024-02-05 LAB
CMV DNA CSF QL NAA+PROBE: SIGNIFICANT CHANGE UP IU/ML
CMV DNA SPEC NAA+PROBE-LOG#: SIGNIFICANT CHANGE UP LOG10IU/ML
EBV DNA SERPL NAA+PROBE-ACNC: 92 IU/ML — HIGH
EBV DNA SERPL NAA+PROBE-ACNC: 94 IU/ML — HIGH
EBVPCR LOG: 1.96 LOG10IU/ML — HIGH
EBVPCR LOG: 1.97 LOG10IU/ML — HIGH

## 2024-02-07 LAB
CULTURE RESULTS: SIGNIFICANT CHANGE UP
CULTURE RESULTS: SIGNIFICANT CHANGE UP
SPECIMEN SOURCE: SIGNIFICANT CHANGE UP
SPECIMEN SOURCE: SIGNIFICANT CHANGE UP

## 2024-02-08 DIAGNOSIS — D72.820 LYMPHOCYTOSIS (SYMPTOMATIC): ICD-10-CM

## 2024-02-08 DIAGNOSIS — R16.2 HEPATOMEGALY WITH SPLENOMEGALY, NOT ELSEWHERE CLASSIFIED: ICD-10-CM

## 2024-02-08 DIAGNOSIS — B34.9 VIRAL INFECTION, UNSPECIFIED: ICD-10-CM

## 2024-02-08 DIAGNOSIS — F41.9 ANXIETY DISORDER, UNSPECIFIED: ICD-10-CM

## 2024-02-08 DIAGNOSIS — E80.7 DISORDER OF BILIRUBIN METABOLISM, UNSPECIFIED: ICD-10-CM

## 2024-02-08 DIAGNOSIS — R10.9 UNSPECIFIED ABDOMINAL PAIN: ICD-10-CM

## 2024-02-08 DIAGNOSIS — R17 UNSPECIFIED JAUNDICE: ICD-10-CM

## 2024-02-08 DIAGNOSIS — D58.9 HEREDITARY HEMOLYTIC ANEMIA, UNSPECIFIED: ICD-10-CM

## 2024-02-08 LAB
A PHAGOCYTOPH IGG TITR SER IF: SIGNIFICANT CHANGE UP
B BURGDOR AB SER QL IA: 0.36 IV — SIGNIFICANT CHANGE UP
B MICROTI IGG TITR SER: SIGNIFICANT CHANGE UP
DENV IGG+IGM PNL SER IA: 7.57 ISR — HIGH
DENV IGM SER-ACNC: <1 ISR — SIGNIFICANT CHANGE UP
E CHAFFEENSIS IGG TITR SER IF: SIGNIFICANT CHANGE UP
LEPTOSPIRA AB TITR SER: NEGATIVE — SIGNIFICANT CHANGE UP

## 2024-02-09 ENCOUNTER — APPOINTMENT (OUTPATIENT)
Dept: INTERNAL MEDICINE | Facility: CLINIC | Age: 29
End: 2024-02-09
Payer: MEDICAID

## 2024-02-09 VITALS
TEMPERATURE: 98.5 F | HEART RATE: 91 BPM | OXYGEN SATURATION: 99 % | SYSTOLIC BLOOD PRESSURE: 121 MMHG | BODY MASS INDEX: 26.43 KG/M2 | DIASTOLIC BLOOD PRESSURE: 75 MMHG | HEIGHT: 61 IN | WEIGHT: 140 LBS

## 2024-02-09 DIAGNOSIS — R94.6 ABNORMAL RESULTS OF THYROID FUNCTION STUDIES: ICD-10-CM

## 2024-02-09 DIAGNOSIS — F32.A DEPRESSION, UNSPECIFIED: ICD-10-CM

## 2024-02-09 DIAGNOSIS — A90 DENGUE FEVER [CLASSICAL DENGUE]: ICD-10-CM

## 2024-02-09 PROBLEM — Z00.00 ENCOUNTER FOR PREVENTIVE HEALTH EXAMINATION: Status: ACTIVE | Noted: 2024-02-09

## 2024-02-09 PROCEDURE — 36415 COLL VENOUS BLD VENIPUNCTURE: CPT

## 2024-02-09 PROCEDURE — 99214 OFFICE O/P EST MOD 30 MIN: CPT | Mod: 25

## 2024-02-12 ENCOUNTER — NON-APPOINTMENT (OUTPATIENT)
Age: 29
End: 2024-02-12

## 2024-02-12 NOTE — HISTORY OF PRESENT ILLNESS
[Discharged on ___] : discharged on [unfilled] [FreeTextEntry2] : Pt is a 28 yo F PMHx of depression presenting after discharge from [facility]. Pt states that she was in the hospital for enlarged liver, spleen, jaundice and dark urine found be Dengue and EBV positive. Today she feels much better her appetite and BM are back to normal. Pt reports mild LUQ pain and back pain that is improving, she takes Tylenol which controls her pain. Pt denies cp, sob, n/v/d, dysuria.

## 2024-02-12 NOTE — REVIEW OF SYSTEMS
[Abdominal Pain] : abdominal pain [Back Pain] : back pain [Swollen Glands] : swollen glands [Fever] : no fever [Chills] : no chills [Fatigue] : no fatigue [Redness] : no redness [Dryness] : no dryness  [Nosebleed] : no nosebleeds [Sore Throat] : no sore throat [Chest Pain] : no chest pain [Palpitations] : no palpitations [Shortness Of Breath] : no shortness of breath [Cough] : no cough [Nausea] : no nausea [Constipation] : no constipation [Diarrhea] : diarrhea [Vomiting] : no vomiting [Dysuria] : no dysuria [Hematuria] : no hematuria [Joint Pain] : no joint pain [Muscle Pain] : no muscle pain [Skin Rash] : no skin rash [Headache] : no headache [Depression] : no depression

## 2024-02-12 NOTE — PHYSICAL EXAM
[No Acute Distress] : no acute distress [Well Nourished] : well nourished [Well Developed] : well developed [Normal Sclera/Conjunctiva] : normal sclera/conjunctiva [PERRL] : pupils equal round and reactive to light [EOMI] : extraocular movements intact [No JVD] : no jugular venous distention [Supple] : supple [Thyroid Normal, No Nodules] : the thyroid was normal and there were no nodules present [No Respiratory Distress] : no respiratory distress  [No Accessory Muscle Use] : no accessory muscle use [Clear to Auscultation] : lungs were clear to auscultation bilaterally [Normal Rate] : normal rate  [Regular Rhythm] : with a regular rhythm [Normal S1, S2] : normal S1 and S2 [No Murmur] : no murmur heard [Pedal Pulses Present] : the pedal pulses are present [No Edema] : there was no peripheral edema [Soft] : abdomen soft [Non-distended] : non-distended [Normal Posterior Cervical Nodes] : no posterior cervical lymphadenopathy [Normal Anterior Cervical Nodes] : no anterior cervical lymphadenopathy [No Joint Swelling] : no joint swelling [Grossly Normal Strength/Tone] : grossly normal strength/tone [No Rash] : no rash [No Focal Deficits] : no focal deficits [Normal Gait] : normal gait [Normal Affect] : the affect was normal [Normal Insight/Judgement] : insight and judgment were intact [de-identified] : Left submandibular lymphadenopathy [de-identified] : TTP RUQ

## 2024-02-13 LAB
ALBUMIN SERPL ELPH-MCNC: 4.7 G/DL
ALP BLD-CCNC: 197 U/L
ALT SERPL-CCNC: 127 U/L
AST SERPL-CCNC: 29 U/L
BILIRUB DIRECT SERPL-MCNC: 0.4 MG/DL
BILIRUB INDIRECT SERPL-MCNC: 0.5 MG/DL
BILIRUB SERPL-MCNC: 0.9 MG/DL
PROT SERPL-MCNC: 7.1 G/DL

## 2024-08-15 NOTE — H&P ADULT - NSHPREVIEWOFSYSTEMS_GEN_ALL_CORE
Physical Therapy Visit    Visit Type: Daily Treatment Note  Visit: 13  Referring Provider: Rodolfo Cantrell DO  Medical Diagnosis (from order): M25.511 - Subscapular pain, right     SUBJECTIVE                                                                                                               Pt states that his pain comes and goes. Pt states that he still gets the pain when he does too much with his arm       OBJECTIVE                                                                                                                                     Treatment    Ultrasound  - Location: Right thoracic paraspinals  - Position: prone  - Duty Cycle: 100%  - Frequency: 3 Mhz  - Intensity (w/cm2): 1.4     - Intensity: patient comfort  - Duration: 10 minutes    Results: no change in symptoms immediately following  Reaction: no adverse reaction to treatment      Therapeutic Exercise  UBE level 1 x 5 minutes   SL scapular PROM, AROM, resisted AROM x 10 with inferior/superior, scapular protraction, retraction   Supine right shoulder protraction 15 x 2       Manual Therapy   STM to right thoracic paraspinals, medial border of scapula x 15 minutes     Skilled input: verbal instruction/cues and tactile instruction/cues    Writer verbally educated and received verbal consent for hand placement, positioning of patient, and techniques to be performed today from patient for clothing adjustments for techniques, therapist position for techniques and hand placement and palpation for techniques as described above and how they are pertinent to the patient's plan of care.  Home Exercise Program  Access Code: [email or web address]  URL: [email or web address]  Date: 06/18/2024  Prepared by: Tasneem Montilla    Exercises  - Thoracic Extension Mobilization on Foam Roll  - 1 x daily - 5 x weekly - 2 sets - 10-12 reps  - Sidelying Open Book Thoracic Lumbar Rotation and Extension  - 1 x daily - 5 x weekly - 2 sets - 10-12  reps  - Supine Lower Trunk Rotation  - 1 x daily - 5 x weekly - 2 sets - 10-12 reps  - Shoulder External Rotation and Scapular Retraction with Resistance  - 1 x daily - 5 x weekly - 2 sets - 10 reps  - Standing Shoulder Horizontal Abduction with Resistance  - 1 x daily - 7 x weekly - 2 sets - 10 reps  - [place]   - 1 x daily - 7 x weekly - 2 sets - 10 reps      ASSESSMENT                                                                                                            Pt continues to have scap immobility especially with overhead reaching and lifting  Pt is progressing with pain levels for daily tasks but overall is still having pain with muscular issues      PLAN                                                                                                                           Suggestions for next session as indicated: Progress per plan of care: Increase cervical paraspinal strength and increase ease with scap strength with continuation of manual and modalities as needed        Therapy procedure time and total treatment time can be found documented on the Time Entry flowsheet     Negative unless stated in HPI

## 2024-09-17 NOTE — DISCHARGE NOTE NURSING/CASE MANAGEMENT/SOCIAL WORK - MODE OF TRANSPORTATION
Carried